# Patient Record
Sex: FEMALE | Race: BLACK OR AFRICAN AMERICAN | Employment: FULL TIME | URBAN - METROPOLITAN AREA
[De-identification: names, ages, dates, MRNs, and addresses within clinical notes are randomized per-mention and may not be internally consistent; named-entity substitution may affect disease eponyms.]

---

## 2018-01-22 ENCOUNTER — OFFICE VISIT (OUTPATIENT)
Dept: FAMILY MEDICINE CLINIC | Age: 48
End: 2018-01-22

## 2018-01-22 VITALS
HEART RATE: 79 BPM | TEMPERATURE: 98.8 F | RESPIRATION RATE: 18 BRPM | OXYGEN SATURATION: 98 % | SYSTOLIC BLOOD PRESSURE: 152 MMHG | BODY MASS INDEX: 32.96 KG/M2 | HEIGHT: 65 IN | WEIGHT: 197.8 LBS | DIASTOLIC BLOOD PRESSURE: 84 MMHG

## 2018-01-22 DIAGNOSIS — E55.9 VITAMIN D DEFICIENCY: ICD-10-CM

## 2018-01-22 DIAGNOSIS — R53.83 FATIGUE, UNSPECIFIED TYPE: ICD-10-CM

## 2018-01-22 DIAGNOSIS — F41.9 ANXIETY: ICD-10-CM

## 2018-01-22 DIAGNOSIS — R00.2 PALPITATIONS: ICD-10-CM

## 2018-01-22 DIAGNOSIS — I10 ESSENTIAL HYPERTENSION: Primary | ICD-10-CM

## 2018-01-22 DIAGNOSIS — E66.9 OBESITY (BMI 30.0-34.9): ICD-10-CM

## 2018-01-22 RX ORDER — LISINOPRIL AND HYDROCHLOROTHIAZIDE 12.5; 2 MG/1; MG/1
TABLET ORAL DAILY
COMMUNITY
End: 2018-01-22

## 2018-01-22 RX ORDER — HYDROCHLOROTHIAZIDE 25 MG/1
25 TABLET ORAL DAILY
Qty: 30 TAB | Refills: 1 | Status: SHIPPED | OUTPATIENT
Start: 2018-01-22 | End: 2018-03-05 | Stop reason: SDUPTHER

## 2018-01-22 RX ORDER — CHOLECALCIFEROL TAB 125 MCG (5000 UNIT) 125 MCG
30000 TAB ORAL
COMMUNITY
End: 2018-06-18

## 2018-01-22 RX ORDER — MELATONIN
DAILY
COMMUNITY
End: 2018-01-22

## 2018-01-22 NOTE — PROGRESS NOTES
Chief Complaint   Patient presents with    Establish Care    Hypertension    Constipation     1. Have you been to the ER, urgent care clinic since your last visit? Hospitalized since your last visit? No    2. Have you seen or consulted any other health care providers outside of the [address] [place] [address] since your last visit? Include any pap smears or colon screening.  Yes Where: Juan Alberto Nelson PCP and Dr.Micheal Arteaga [facility] and [facility]

## 2018-01-22 NOTE — PROGRESS NOTES
INTERNAL MEDICINE INITIAL PROVIDER VISIT    SUBJECTIVE:     Chief Complaint   Patient presents with    Establish Care    Hypertension    Constipation       HPI: 41 y.o. female with PMHx significant for obesity and HTN is here for the above chief complaint(s). Establish Care: Last PCP visit 3 years ago, last blood work 3 years ago, 1.5 years ago pt first on [email or web address]. Hypertension: Today BP is uncontrolled. Taking lisinopril-HCTZ 20-12.5 mg 1 tab daily, last dose 6 months ago. Tiredness and fatigue side effects from medications around middle of day, started taking at night. HCTZ without side effects by itself. Denies headache, lightheadedness, dizziness, vision changes, chest pain, rapid/irregular heart rate, shortness of breath, cough, abdominal pain, leg swelling, leg pain. Not working on low salt diet, rare activity/exercise. Weight watchers since October 2017, lost 7-8 lbs, stalled during the holidays. Does checks BP outside of office with readings averaging with headaches 160s/90s-100s. Constipation: 3 months ago, flat stool. Before incomplete bowel movements. 3rd week of 21 day fast, nuts and whole grains, oatmeal. Last BM yesterrday, on average BM every daily. In past 2x per week. Denies BRBPR, melena, no straing. Water intake per day 3-4 bottled lind. Coffee intake 1 cup per day. Palpitations: Occasional over past few weeks, off and on for past year. Feels during times of feeling overwhelmed. On average now 2x per week, lasting a few seconds. No chest pain or shortness of breath with. Increased anxiety and trouble controlling worry. Declines medications at this time. Muscle/Joint: Back, willing to talk about later    Low Energy: 1 year. Sleep no issue. Some snoring. No PND, orthopnea. Some AM headaches. No witnessed apneic episodes, 1 cup of coffee per day. Does TV/Phone/Reading in bed. Negative irregular bedtime. No stretchin gbefore bed.  STOP-BANG: + snoring, + day time fatigue, - observied apneic episodes, + HTN, - BMI >35, - AGE >50, - neck size >17 in male >15 in female, - male gender. Low- Intermediate risk      ROS: 12 point ROS completed, positive as per HPI      Current Outpatient Prescriptions   Medication Sig    OTHER Taking for cramps and lighter menses, 1x per month  Indications: [place] 1 tab monthly    levonorgestrel (MIRENA) 20 mcg/24 hr (5 years) IUD 1 Applicator. Starting 7/2015, 5 years    cholecalciferol, VITAMIN D3, (VITAMIN D3) 5,000 unit tab tablet Take 30,000 Units by mouth every seven (7) days.  hydroCHLOROthiazide (HYDRODIURIL) 25 mg tablet Take 1 Tab by mouth daily for 60 days. No current facility-administered medications for this visit. Health Maintenance   Topic Date Due    PAP AKA CERVICAL CYTOLOGY  06/28/1984    DTaP/Tdap/Td series (1 - Tdap) 06/28/1993    Influenza Age 5 to Adult  08/01/2017       Medications and Allergies: Reviewed and confirmed in the chart    Past Medical Hx: Reviewed and confirmed in the chart  Past Medical History:   Diagnosis Date    Hypertension 2009    Obesity (BMI 30.0-34.9) 2008    Vitamin D deficiency        Family Hx, Surgical Hx, Social Hx: Reviewed and updated in EMR    OBJECTIVE:  Vitals:    01/22/18 0937 01/22/18 1020   BP: 158/85 152/84   Pulse: 79    Resp: 18    Temp: 98.8 °F (37.1 °C)    TempSrc: Oral    SpO2: 98%    Weight: 197 lb 12.8 oz (89.7 kg)    Height: 5' 5\" (1.651 m)      General: Pleasant adult woman in no acute distress  HEENT: Head is atraumatic normo-cephalic. Pupils equally round and reactive to light, extraocular muscle intact, conjunctiva clear, non-icterus. Oral mucosa moist without erythema, ulceration. Fair Dentition. Neck: Supple, no LAD, no palpable thyromegaly. CVS:Heart regular, rate, and rhythm. Audible S1 and S2. No murmurs, rubs or gallops. PULM: Lungs clear to auscultation bilaterally. No wheezes, rales or rhonchi.    GI: Positive bowel sounds, soft, nondistended, non-tender. EXT: 2+ dorsalis pedis pulses bilaterally. No pedal edema bilaterally  Neuro: Alert and oriented x3. Gait wnl  MSE: Mood euthymic, affect congruent and reactive. No SI/HI. PHQ-9: 5  GREG-7: 6      Nursing Notes Reviewed    ASSESSMENT AND PLAN    ICD-10-CM ICD-9-CM    1. Essential hypertension I10 401.9 TSH 3RD GENERATION      METABOLIC PANEL, COMPREHENSIVE      hydroCHLOROthiazide (HYDRODIURIL) 25 mg tablet  BP log  DASH diet  RTC in 4 weeks   2. Vitamin D deficiency E55.9 268.9 VITAMIN D, 25 HYDROXY   3. Obesity (BMI 30.0-34. 9) E66.9 278.00 TSH 3RD GENERATION      LIPID PANEL  Diet and exercise discussed  SUNDAY signed for last PCP, blood work   4. Fatigue, unspecified type R53.83 780.79 TSH 3RD GENERATION      CBC WITH AUTOMATED DIFF   5. Anxiety F41.9 300.00 bhvr recommendations given  RTC in 4 weeks   6. Palpitations R00.2 785.1 Given urgent return precautions       Orders Placed This Encounter    TSH 3RD GENERATION    METABOLIC PANEL, COMPREHENSIVE    CBC WITH AUTOMATED DIFF    LIPID PANEL    VITAMIN D, 25 HYDROXY    DISCONTD: cholecalciferol (VITAMIN D3) 1,000 unit tablet    OTHER    DISCONTD: lisinopril-hydroCHLOROthiazide (PRINZIDE, ZESTORETIC) 20-12.5 mg per tablet    levonorgestrel (MIRENA) 20 mcg/24 hr (5 years) IUD    cholecalciferol, VITAMIN D3, (VITAMIN D3) 5,000 unit tab tablet    hydroCHLOROthiazide (HYDRODIURIL) 25 mg tablet       RTC IN 4 WEEKS f/u HTN, Anxiety    AVS printed and provided to patient    Assessment and plan above discussed with patient, patient voiced understanding and agreement with plan. More than 50% of this 60 min visit was spent face to face counseling the patient about diet, exercise, medications and other management for HTN, vitamin d deficiency, anxiety, obesity, fatigue      Manual Neri POLK   [address], [address] [place] [address] [phone]  [place] [phone]

## 2018-01-22 NOTE — MR AVS SNAPSHOT
[address] [place] [address] 
[phone] Patient: Kurtis Wiley MRN: [id number] :1970 Visit Information Date & Time Provider Department Dept. Phone Encounter #  
 2018  9:30 AM Marino He MD [address] [facility] [address] [phone] [id number] Follow-up Instructions Return in about 4 weeks (around 2018), or if symptoms worsen or fail to improve, for anxiety, HTN. Upcoming Health Maintenance Date Due  
 PAP AKA CERVICAL CYTOLOGY 1984 DTaP/Tdap/Td series (1 - Tdap) 1993 Influenza Age 5 to Adult 2017 Allergies as of 2018  Review Complete On: 2018 By: Radha Randolph LPN Severity Noted Reaction Type Reactions Codeine  2018    Nausea and Vomiting Current Immunizations  Never Reviewed No immunizations on file. Not reviewed this visit You Were Diagnosed With   
  
 Codes Comments Essential hypertension    -  Primary ICD-10-CM: I10 
ICD-9-CM: 401.9 Vitamin D deficiency     ICD-10-CM: E55.9 ICD-9-CM: 268.9 Obesity (BMI 30.0-34.9)     ICD-10-CM: N09.0 ICD-9-CM: 278.00 Fatigue, unspecified type     ICD-10-CM: R53.83 ICD-9-CM: 780.79 Anxiety     ICD-10-CM: F41.9 ICD-9-CM: 300.00 Palpitations     ICD-10-CM: R00.2 ICD-9-CM: 785.1 Vitals BP Pulse Temp Resp Height(growth percentile) Weight(growth percentile) 152/84 79 98.8 °F (37.1 °C) (Oral) 18 5' 5\" (1.651 m) 197 lb 12.8 oz (89.7 kg) LMP SpO2 BMI OB Status Smoking Status 2017 98% 32.92 kg/m2 Having regular periods Never Smoker Vitals History BMI and BSA Data Body Mass Index Body Surface Area  
 32.92 kg/m 2 2.03 m 2 Preferred Pharmacy Pharmacy Name Phone [id number], [phone]  [phone] Your Updated Medication List  
  
   
 This list is accurate as of: 1/22/18 10:34 AM.  Always use your most recent med list.  
  
  
  
  
 cholecalciferol (VITAMIN D3) 5,000 unit Tab tablet Commonly known as:  VITAMIN D3 Take 30,000 Units by mouth every seven (7) days. hydroCHLOROthiazide 25 mg tablet Commonly known as:  HYDRODIURIL Take 1 Tab by mouth daily for 60 days. MIRENA 20 mcg/24 hr (5 years) Iud  
Generic drug:  levonorgestrel 1 Applicator. Starting 7/2015, 5 years OTHER Taking for cramps and lighter menses, 1x per month  Indications: Rianna Ezio 1 tab monthly Prescriptions Sent to Pharmacy Refills  
 hydroCHLOROthiazide (HYDRODIURIL) 25 mg tablet 1 Sig: Take 1 Tab by mouth daily for 60 days. Class: Normal  
 Pharmacy: [id number]- [address] [phone]  #: [phone] Route: Oral  
  
Follow-up Instructions Return in about 4 weeks (around 2/19/2018), or if symptoms worsen or fail to improve, for anxiety, HTN. To-Do List   
 01/22/2018 Lab:  CBC WITH AUTOMATED DIFF   
  
 01/22/2018 Lab:  LIPID PANEL   
  
 01/22/2018 Lab:  METABOLIC PANEL, COMPREHENSIVE   
  
 01/22/2018 Lab:  TSH 3RD GENERATION   
  
 01/22/2018 Lab:  VITAMIN D, 25 HYDROXY Patient Instructions Return to in 1 week after starting HCTZ to give fasting blood after fasting 8 hours overnight. No food or coffee. Water and medicines are okay. Lab opens from 8:30AM to 4:30PM Monday through Friday. No need to schedule an appointment. If palpitations/rapid heart beats start lasting greater than a few minutes, associated with shortness of breath or chest pain report to local hospital or urgent care We will let you know the results of your blood and urine test when they come in. We will call if abnormal and send a letter if normal. 
 
HIGH BLOOD PRESSURE: 
Start taking hydrochlorothiazide 25 mg every morning with food Check blood pressure mid-day 1-2  x per week and write down If BP persistently >140/>90 call clinic to discuss with MD 
If BP <110/<70 call clinic to discuss Make sure you are seated for at least  5-10 minutes, legs uncrossed and back resting Bring log to clinic next visit Max salt per day 2 grams (2000 mg) Try to exercise 30 minutes every morning FOR ANXIETY: 
Work on meditation outlined below Try deep breathing, in through nose and out through mouth with increased anxiety/overwhelmed Try whole body tension relaxation as discussed in clinic Continue to eat three meals a day and and water at least 2 liters per day Work on stretching at night before bed HOW TO MEDITATE: SIMPLE MEDITATION FOR BEGINNERS 
  
This meditation exercise is an excellent introduction to meditation techniques. 
  
Sit comfortably. You may even want to invest in a meditation chair. Close your eyes. Make no effort to control the breath; simply breathe naturally. Focus your attention on the breath  (think \"im breathing in\" when breathing in, think \"im breathing out\" when breathing out) and on how the body moves with each inhalation and exhalation. Notice the movement of your body as you breathe. Observe your chest, shoulders, rib cage, and belly. Simply focus your attention on your breath without controlling its pace or intensity. If your mind wanders, return your focus back to your breath. Focus on breathing in as you take in a breath and focus on breathing out as you exhale Maintain this meditation practice for two to three minutes to start, and then try it for longer periods gradually extended to 5 to 10 minutes daily. Hydrochlorothiazide (By mouth) Hydrochlorothiazide (hakeem-droe-klor-hk-WUTN-p-zide) Treats high blood pressure and fluid retention (edema). This medicine is a diuretic (water pill). Brand Name(s): Microzide There may be other brand names for this medicine. When This Medicine Should Not Be Used: This medicine is not right for everyone. Do not use this medicine if you had an allergic reaction to hydrochlorothiazide or a sulfa drug. How to Use This Medicine:  
Capsule, Liquid, Tablet · Take your medicine as directed. Your dose may need to be changed several times to find what works best for you. · Measure the oral liquid medicine with a marked measuring spoon, oral syringe, or medicine cup. · Missed dose: Take a dose as soon as you remember. If it is almost time for your next dose, wait until then and take a regular dose. Do not take extra medicine to make up for a missed dose. · Store the medicine in a closed container at room temperature, away from heat, moisture, and direct light. Drugs and Foods to Avoid: Ask your doctor or pharmacist before using any other medicine, including over-the-counter medicines, vitamins, and herbal products. · Some medicines and foods can affect how hydrochlorothiazide works. Tell your doctor if you are also using any of the following: ¨ Cholestyramine, colestipol, digoxin, lithium, insulin or other diabetes medicine ¨ An NSAID pain or arthritis medicine (such as aspirin, diclofenac, ibuprofen, naproxen, celecoxib), or a steroid medicine (such as hydrocortisone, methylprednisolone, prednisone, prednisolone, dexamethasone) Warnings While Using This Medicine: · Tell your doctor if you are pregnant or breastfeeding, or if you have kidney disease, liver disease, heart disease or heart failure, high cholesterol, diabetes, gout, trouble urinating, or lupus. · This medicine may cause the following problems: ¨ Glaucoma and other vision problems ¨ Acute gout ¨ Damage to the parathyroid gland ¨ Low or high levels of minerals in your blood (including potassium and sodium) · This medicine may make you dizzy. Do not drive or do anything else that could be dangerous until you know how this medicine affects you. · This medicine could lower your blood pressure too much, especially when you first use it or if you are dehydrated. Stand or sit up slowly if you feel lightheaded or dizzy. Alcohol may make this problem worse. · Tell any doctor or dentist who treats you that you are using this medicine. · Your doctor will check your progress and the effects of this medicine at regular visits. Keep all appointments. · Keep all medicine out of the reach of children. Never share your medicine with anyone. Possible Side Effects While Using This Medicine:  
Call your doctor right away if you notice any of these side effects: · Allergic reaction: Itching or hives, swelling in your face or hands, swelling or tingling in your mouth or throat, chest tightness, trouble breathing · Blistering, peeling, or red skin rash · Confusion, weakness, and muscle twitching · Dry mouth, increased thirst, muscle cramps, nausea or vomiting, uneven heartbeat · Lightheadedness, dizziness, or fainting · Nausea, vomiting, unusual tiredness or weakness, muscle cramps, confusion · Trouble seeing, eye pain, blurred vision or other vision changes If you notice these less serious side effects, talk with your doctor:  
· Headache · Mild diarrhea, constipation, nausea If you notice other side effects that you think are caused by this medicine, tell your doctor. Call your doctor for medical advice about side effects. You may report side effects to FDA at 1-096-FDA-4082 © 2017 [address] Information is for End User's use only and may not be sold, redistributed or otherwise used for commercial purposes. The above information is an  only. It is not intended as medical advice for individual conditions or treatments. Talk to your doctor, nurse or pharmacist before following any medical regimen to see if it is safe and effective for you. DASH Diet: Care Instructions Your Care Instructions The DASH diet is an eating plan that can help lower your blood pressure. DASH stands for Dietary Approaches to Stop Hypertension. Hypertension is high blood pressure. The DASH diet focuses on eating foods that are high in calcium, potassium, and magnesium. These nutrients can lower blood pressure. The foods that are highest in these nutrients are fruits, vegetables, low-fat dairy products, nuts, seeds, and legumes. But taking calcium, potassium, and magnesium supplements instead of eating foods that are high in those nutrients does not have the same effect. The DASH diet also includes whole grains, fish, and poultry. The DASH diet is one of several lifestyle changes your doctor may recommend to lower your high blood pressure. Your doctor may also want you to decrease the amount of sodium in your diet. Lowering sodium while following the DASH diet can lower blood pressure even further than just the DASH diet alone. Follow-up care is a key part of your treatment and safety. Be sure to make and go to all appointments, and call your doctor if you are having problems. It's also a good idea to know your test results and keep a list of the medicines you take. How can you care for yourself at home? Following the DASH diet · Eat 4 to 5 servings of fruit each day. A serving is 1 medium-sized piece of fruit, ½ cup chopped or canned fruit, 1/4 cup dried fruit, or 4 ounces (½ cup) of fruit juice. Choose fruit more often than fruit juice. · Eat 4 to 5 servings of vegetables each day. A serving is 1 cup of lettuce or raw leafy vegetables, ½ cup of chopped or cooked vegetables, or 4 ounces (½ cup) of vegetable juice. Choose vegetables more often than vegetable juice. · Get 2 to 3 servings of low-fat and fat-free dairy each day. A serving is 8 ounces of milk, 1 cup of yogurt, or 1 ½ ounces of cheese. · Eat 6 to 8 servings of grains each day.  A serving is 1 slice of bread, 1 ounce of dry cereal, or ½ cup of cooked rice, pasta, or cooked cereal. Try to choose whole-grain products as much as possible. · Limit lean meat, poultry, and fish to 2 servings each day. A serving is 3 ounces, about the size of a deck of cards. · Eat 4 to 5 servings of nuts, seeds, and legumes (cooked dried beans, lentils, and split peas) each week. A serving is 1/3 cup of nuts, 2 tablespoons of seeds, or ½ cup of cooked beans or peas. · Limit fats and oils to 2 to 3 servings each day. A serving is 1 teaspoon of vegetable oil or 2 tablespoons of salad dressing. · Limit sweets and added sugars to 5 servings or less a week. A serving is 1 tablespoon jelly or jam, ½ cup sorbet, or 1 cup of lemonade. · Eat less than 2,300 milligrams (mg) of sodium a day. If you limit your sodium to 1,500 mg a day, you can lower your blood pressure even more. Tips for success · Start small. Do not try to make dramatic changes to your diet all at once. You might feel that you are missing out on your favorite foods and then be more likely to not follow the plan. Make small changes, and stick with them. Once those changes become habit, add a few more changes. · Try some of the following: ¨ Make it a goal to eat a fruit or vegetable at every meal and at snacks. This will make it easy to get the recommended amount of fruits and vegetables each day. ¨ Try yogurt topped with fruit and nuts for a snack or healthy dessert. ¨ Add lettuce, tomato, cucumber, and onion to sandwiches. ¨ Combine a ready-made pizza crust with low-fat mozzarella cheese and lots of vegetable toppings. Try using tomatoes, squash, spinach, broccoli, carrots, cauliflower, and onions. ¨ Have a variety of cut-up vegetables with a low-fat dip as an appetizer instead of chips and dip. ¨ Sprinkle sunflower seeds or chopped almonds over salads. Or try adding chopped walnuts or almonds to cooked vegetables. ¨ Try some vegetarian meals using beans and peas. Add garbanzo or kidney beans to salads. Make burritos and tacos with mashed aaron beans or black beans. Where can you learn more? Go to http://lily-osiel.info/. Enter D142 in the search box to learn more about \"DASH Diet: Care Instructions. \" Current as of: September 21, 2016 Content Version: 11.4 © 9181-2847 [employer]. Care instructions adapted under license by [employer] (which disclaims liability or warranty for this information). If you have questions about a medical condition or this instruction, always ask your healthcare professional. [place] [address] any warranty or liability for your use of this information. Introducing [facility]! Wes Mullen introduces [employer] patient portal. Now you can access parts of your medical record, email your doctor's office, and request medication refills online. 1. In your internet browser, go to [email or web address] 2. Click on the First Time User? Click Here link in the Sign In box. You will see the New Member Sign Up page. 3. Enter your [employer] Access Code exactly as it appears below. You will not need to use this code after youve completed the sign-up process. If you do not sign up before the expiration date, you must request a new code. · [employer] Access Code: [email or web address] Expires: 4/22/2018  9:34 AM 
 
4. Enter the last four digits of your Social Security Number (xxxx) and Date of Birth (mm/dd/yyyy) as indicated and click Submit. You will be taken to the next sign-up page. 5. Create a [employer] ID. This will be your [employer] login ID and cannot be changed, so think of one that is secure and easy to remember. 6. Create a [employer] password. You can change your password at any time. 7. Enter your Password Reset Question and Answer. This can be used at a later time if you forget your password. 8. Enter your e-mail address. You will receive e-mail notification when new information is available in [address] 9. Click Sign Up. You can now view and download portions of your medical record. 10. Click the Download Summary menu link to download a portable copy of your medical information. If you have questions, please visit the Frequently Asked Questions section of the [employer] website. Remember, [employer] is NOT to be used for urgent needs. For medical emergencies, dial 911. Now available from your iPhone and Android! Please provide this summary of care documentation to your next provider. Your primary care clinician is listed as Sunday Gunn. If you have any questions after today's visit, please call [phone].

## 2018-01-22 NOTE — PATIENT INSTRUCTIONS
Return to in 1 week after starting HCTZ to give fasting blood after fasting 8 hours overnight. No food or coffee. Water and medicines are okay. Lab opens from 8:30AM to 4:30PM Monday through Friday. No need to schedule an appointment. If palpitations/rapid heart beats start lasting greater than a few minutes, associated with shortness of breath or chest pain report to local hospital or urgent care    We will let you know the results of your blood and urine test when they come in. We will call if abnormal and send a letter if normal.    HIGH BLOOD PRESSURE:  Start taking hydrochlorothiazide 25 mg every morning with food  Check blood pressure mid-day 1-2  x per week and write down  If BP persistently >140/>90 call clinic to discuss with MD  If BP <110/<70 call clinic to discuss  Make sure you are seated for at least  5-10 minutes, legs uncrossed and back resting  Bring log to clinic next visit  Max salt per day 2 grams (2000 mg)  Try to exercise 30 minutes every morning    FOR ANXIETY:  Work on meditation outlined below  Try deep breathing, in through nose and out through mouth with increased anxiety/overwhelmed  Try whole body tension relaxation as discussed in clinic  Continue to eat three meals a day and and water at least 2 liters per day  Work on stretching at night before bed    HOW TO MEDITATE: SIMPLE MEDITATION FOR BEGINNERS     This meditation exercise is an excellent introduction to meditation techniques.     Sit comfortably. You may even want to invest in a meditation chair. Close your eyes. Make no effort to control the breath; simply breathe naturally. Focus your attention on the breath  (think \"im breathing in\" when breathing in, think \"im breathing out\" when breathing out) and on how the body moves with each inhalation and exhalation. Notice the movement of your body as you breathe. Observe your chest, shoulders, rib cage, and belly.  Simply focus your attention on your breath without controlling its pace or intensity. If your mind wanders, return your focus back to your breath. Focus on breathing in as you take in a breath and focus on breathing out as you exhale  Maintain this meditation practice for two to three minutes to start, and then try it for longer periods gradually extended to 5 to 10 minutes daily. Hydrochlorothiazide (By mouth)   Hydrochlorothiazide (hakeem-droe-klor-ge-CDHR-u-zide)  Treats high blood pressure and fluid retention (edema). This medicine is a diuretic (water pill). Brand Name(s): Microzide   There may be other brand names for this medicine. When This Medicine Should Not Be Used: This medicine is not right for everyone. Do not use this medicine if you had an allergic reaction to hydrochlorothiazide or a sulfa drug. How to Use This Medicine:   Capsule, Liquid, Tablet  · Take your medicine as directed. Your dose may need to be changed several times to find what works best for you. · Measure the oral liquid medicine with a marked measuring spoon, oral syringe, or medicine cup. · Missed dose: Take a dose as soon as you remember. If it is almost time for your next dose, wait until then and take a regular dose. Do not take extra medicine to make up for a missed dose. · Store the medicine in a closed container at room temperature, away from heat, moisture, and direct light. Drugs and Foods to Avoid:   Ask your doctor or pharmacist before using any other medicine, including over-the-counter medicines, vitamins, and herbal products. · Some medicines and foods can affect how hydrochlorothiazide works.  Tell your doctor if you are also using any of the following:   ¨ Cholestyramine, colestipol, digoxin, lithium, insulin or other diabetes medicine  ¨ An NSAID pain or arthritis medicine (such as aspirin, diclofenac, ibuprofen, naproxen, celecoxib), or a steroid medicine (such as hydrocortisone, methylprednisolone, prednisone, prednisolone, dexamethasone)  Warnings While Using This Medicine:   · Tell your doctor if you are pregnant or breastfeeding, or if you have kidney disease, liver disease, heart disease or heart failure, high cholesterol, diabetes, gout, trouble urinating, or lupus. · This medicine may cause the following problems:  ¨ Glaucoma and other vision problems  ¨ Acute gout  ¨ Damage to the parathyroid gland  ¨ Low or high levels of minerals in your blood (including potassium and sodium)  · This medicine may make you dizzy. Do not drive or do anything else that could be dangerous until you know how this medicine affects you. · This medicine could lower your blood pressure too much, especially when you first use it or if you are dehydrated. Stand or sit up slowly if you feel lightheaded or dizzy. Alcohol may make this problem worse. · Tell any doctor or dentist who treats you that you are using this medicine. · Your doctor will check your progress and the effects of this medicine at regular visits. Keep all appointments. · Keep all medicine out of the reach of children. Never share your medicine with anyone. Possible Side Effects While Using This Medicine:   Call your doctor right away if you notice any of these side effects:  · Allergic reaction: Itching or hives, swelling in your face or hands, swelling or tingling in your mouth or throat, chest tightness, trouble breathing  · Blistering, peeling, or red skin rash  · Confusion, weakness, and muscle twitching  · Dry mouth, increased thirst, muscle cramps, nausea or vomiting, uneven heartbeat  · Lightheadedness, dizziness, or fainting  · Nausea, vomiting, unusual tiredness or weakness, muscle cramps, confusion  · Trouble seeing, eye pain, blurred vision or other vision changes  If you notice these less serious side effects, talk with your doctor:   · Headache  · Mild diarrhea, constipation, nausea  If you notice other side effects that you think are caused by this medicine, tell your doctor.    Call your doctor for medical advice about side effects. You may report side effects to FDA at 8-655-FDA-1800  © 2017 [address] [employer] Information is for End User's use only and may not be sold, redistributed or otherwise used for commercial purposes. The above information is an  only. It is not intended as medical advice for individual conditions or treatments. Talk to your doctor, nurse or pharmacist before following any medical regimen to see if it is safe and effective for you. DASH Diet: Care Instructions  Your Care Instructions    The DASH diet is an eating plan that can help lower your blood pressure. DASH stands for Dietary Approaches to Stop Hypertension. Hypertension is high blood pressure. The DASH diet focuses on eating foods that are high in calcium, potassium, and magnesium. These nutrients can lower blood pressure. The foods that are highest in these nutrients are fruits, vegetables, low-fat dairy products, nuts, seeds, and legumes. But taking calcium, potassium, and magnesium supplements instead of eating foods that are high in those nutrients does not have the same effect. The DASH diet also includes whole grains, fish, and poultry. The DASH diet is one of several lifestyle changes your doctor may recommend to lower your high blood pressure. Your doctor may also want you to decrease the amount of sodium in your diet. Lowering sodium while following the DASH diet can lower blood pressure even further than just the DASH diet alone. Follow-up care is a key part of your treatment and safety. Be sure to make and go to all appointments, and call your doctor if you are having problems. It's also a good idea to know your test results and keep a list of the medicines you take. How can you care for yourself at home? Following the DASH diet  · Eat 4 to 5 servings of fruit each day.  A serving is 1 medium-sized piece of fruit, ½ cup chopped or canned fruit, 1/4 cup dried fruit, or 4 ounces (½ cup) of fruit juice. Choose fruit more often than fruit juice. · Eat 4 to 5 servings of vegetables each day. A serving is 1 cup of lettuce or raw leafy vegetables, ½ cup of chopped or cooked vegetables, or 4 ounces (½ cup) of vegetable juice. Choose vegetables more often than vegetable juice. · Get 2 to 3 servings of low-fat and fat-free dairy each day. A serving is 8 ounces of milk, 1 cup of yogurt, or 1 ½ ounces of cheese. · Eat 6 to 8 servings of grains each day. A serving is 1 slice of bread, 1 ounce of dry cereal, or ½ cup of cooked rice, pasta, or cooked cereal. Try to choose whole-grain products as much as possible. · Limit lean meat, poultry, and fish to 2 servings each day. A serving is 3 ounces, about the size of a deck of cards. · Eat 4 to 5 servings of nuts, seeds, and legumes (cooked dried beans, lentils, and split peas) each week. A serving is 1/3 cup of nuts, 2 tablespoons of seeds, or ½ cup of cooked beans or peas. · Limit fats and oils to 2 to 3 servings each day. A serving is 1 teaspoon of vegetable oil or 2 tablespoons of salad dressing. · Limit sweets and added sugars to 5 servings or less a week. A serving is 1 tablespoon jelly or jam, ½ cup sorbet, or 1 cup of lemonade. · Eat less than 2,300 milligrams (mg) of sodium a day. If you limit your sodium to 1,500 mg a day, you can lower your blood pressure even more. Tips for success  · Start small. Do not try to make dramatic changes to your diet all at once. You might feel that you are missing out on your favorite foods and then be more likely to not follow the plan. Make small changes, and stick with them. Once those changes become habit, add a few more changes. · Try some of the following:  ¨ Make it a goal to eat a fruit or vegetable at every meal and at snacks. This will make it easy to get the recommended amount of fruits and vegetables each day. ¨ Try yogurt topped with fruit and nuts for a snack or healthy dessert.   ¨ Add lettuce, tomato, cucumber, and onion to sandwiches. ¨ Combine a ready-made pizza crust with low-fat mozzarella cheese and lots of vegetable toppings. Try using tomatoes, squash, spinach, broccoli, carrots, cauliflower, and onions. ¨ Have a variety of cut-up vegetables with a low-fat dip as an appetizer instead of chips and dip. ¨ Sprinkle sunflower seeds or chopped almonds over salads. Or try adding chopped walnuts or almonds to cooked vegetables. ¨ Try some vegetarian meals using beans and peas. Add garbanzo or kidney beans to salads. Make burritos and tacos with mashed aaron beans or black beans. Where can you learn more? Go to http://lily-osiel.info/. Enter S878 in the search box to learn more about \"DASH Diet: Care Instructions. \"  Current as of: September 21, 2016  Content Version: 11.4  © 0188-7704 [employer]. Care instructions adapted under license by [employer] (which disclaims liability or warranty for this information). If you have questions about a medical condition or this instruction, always ask your healthcare professional. [place] [address] any warranty or liability for your use of this information.

## 2018-02-09 ENCOUNTER — HOSPITAL ENCOUNTER (OUTPATIENT)
Dept: LAB | Age: 48
Discharge: HOME | End: 2018-02-09
Payer: COMMERCIAL

## 2018-02-09 DIAGNOSIS — E55.9 VITAMIN D DEFICIENCY: ICD-10-CM

## 2018-02-09 DIAGNOSIS — E66.9 OBESITY (BMI 30.0-34.9): ICD-10-CM

## 2018-02-09 DIAGNOSIS — I10 ESSENTIAL HYPERTENSION: ICD-10-CM

## 2018-02-09 DIAGNOSIS — R53.83 FATIGUE, UNSPECIFIED TYPE: ICD-10-CM

## 2018-02-09 LAB
ALBUMIN SERPL-MCNC: 3.6 G/DL (ref 3.4–5)
ALBUMIN/GLOB SERPL: 1.1 {RATIO} (ref 0.8–1.7)
ALP SERPL-CCNC: 84 U/L (ref 45–117)
ALT SERPL-CCNC: 18 U/L (ref 13–56)
ANION GAP SERPL CALC-SCNC: 9 MMOL/L (ref 3–18)
AST SERPL-CCNC: 17 U/L (ref 15–37)
BASOPHILS # BLD: 0 K/UL (ref 0–0.06)
BASOPHILS NFR BLD: 1 % (ref 0–2)
BILIRUB SERPL-MCNC: 0.4 MG/DL (ref 0.2–1)
BUN SERPL-MCNC: 9 MG/DL (ref 7–18)
BUN/CREAT SERPL: 10 (ref 12–20)
CALCIUM SERPL-MCNC: 9.1 MG/DL (ref 8.5–10.1)
CHLORIDE SERPL-SCNC: 100 MMOL/L (ref 100–108)
CHOLEST SERPL-MCNC: 165 MG/DL
CO2 SERPL-SCNC: 28 MMOL/L (ref 21–32)
CREAT SERPL-MCNC: 0.88 MG/DL (ref 0.6–1.3)
DIFFERENTIAL METHOD BLD: NORMAL
EOSINOPHIL # BLD: 0.1 K/UL (ref 0–0.4)
EOSINOPHIL NFR BLD: 2 % (ref 0–5)
ERYTHROCYTE [DISTWIDTH] IN BLOOD BY AUTOMATED COUNT: 14 % (ref 11.6–14.5)
GLOBULIN SER CALC-MCNC: 3.2 G/DL (ref 2–4)
GLUCOSE SERPL-MCNC: 93 MG/DL (ref 74–99)
HCT VFR BLD AUTO: 40.2 % (ref 35–45)
HDLC SERPL-MCNC: 52 MG/DL (ref 40–60)
HDLC SERPL: 3.2 {RATIO} (ref 0–5)
HGB BLD-MCNC: 12.9 G/DL (ref 12–16)
LDLC SERPL CALC-MCNC: 91.6 MG/DL (ref 0–100)
LIPID PROFILE,FLP: NORMAL
LYMPHOCYTES # BLD: 1.9 K/UL (ref 0.9–3.6)
LYMPHOCYTES NFR BLD: 30 % (ref 21–52)
MCH RBC QN AUTO: 26.7 PG (ref 24–34)
MCHC RBC AUTO-ENTMCNC: 32.1 G/DL (ref 31–37)
MCV RBC AUTO: 83.1 FL (ref 74–97)
MONOCYTES # BLD: 0.5 K/UL (ref 0.05–1.2)
MONOCYTES NFR BLD: 9 % (ref 3–10)
NEUTS SEG # BLD: 3.7 K/UL (ref 1.8–8)
NEUTS SEG NFR BLD: 58 % (ref 40–73)
PLATELET # BLD AUTO: 268 K/UL (ref 135–420)
PMV BLD AUTO: 10 FL (ref 9.2–11.8)
POTASSIUM SERPL-SCNC: 3.5 MMOL/L (ref 3.5–5.5)
PROT SERPL-MCNC: 6.8 G/DL (ref 6.4–8.2)
RBC # BLD AUTO: 4.84 M/UL (ref 4.2–5.3)
SODIUM SERPL-SCNC: 137 MMOL/L (ref 136–145)
TRIGL SERPL-MCNC: 107 MG/DL (ref ?–150)
TSH SERPL DL<=0.05 MIU/L-ACNC: 0.74 UIU/ML (ref 0.36–3.74)
VLDLC SERPL CALC-MCNC: 21.4 MG/DL
WBC # BLD AUTO: 6.3 K/UL (ref 4.6–13.2)

## 2018-02-09 PROCEDURE — 85025 COMPLETE CBC W/AUTO DIFF WBC: CPT | Performed by: INTERNAL MEDICINE

## 2018-02-09 PROCEDURE — 84443 ASSAY THYROID STIM HORMONE: CPT | Performed by: INTERNAL MEDICINE

## 2018-02-09 PROCEDURE — 36415 COLL VENOUS BLD VENIPUNCTURE: CPT | Performed by: INTERNAL MEDICINE

## 2018-02-09 PROCEDURE — 82306 VITAMIN D 25 HYDROXY: CPT | Performed by: INTERNAL MEDICINE

## 2018-02-09 PROCEDURE — 80053 COMPREHEN METABOLIC PANEL: CPT | Performed by: INTERNAL MEDICINE

## 2018-02-09 PROCEDURE — 80061 LIPID PANEL: CPT | Performed by: INTERNAL MEDICINE

## 2018-02-10 LAB — 25(OH)D3 SERPL-MCNC: 36.7 NG/ML (ref 30–100)

## 2018-02-27 ENCOUNTER — TELEPHONE (OUTPATIENT)
Dept: FAMILY MEDICINE CLINIC | Age: 48
End: 2018-02-27

## 2018-02-28 ENCOUNTER — TELEPHONE (OUTPATIENT)
Dept: FAMILY MEDICINE CLINIC | Age: 48
End: 2018-02-28

## 2018-02-28 NOTE — TELEPHONE ENCOUNTER
Pt's  Dariana Alarcon called back and stated forms will faxed within a few minutes. States please complete form and fax back by today if possible.

## 2018-02-28 NOTE — TELEPHONE ENCOUNTER
Health care biometric form completed and placed in outgoing fax box. Yamileth Barragan M.D.   [address], [address] [place] [address], [address] [place] [address] -   [place] - [phone]

## 2018-02-28 NOTE — TELEPHONE ENCOUNTER
LPN please call family back to inform no paperwork was received for patient from any companies. Please have insurance company fax information to [phone] for completion. If appointment needed will call to inform. Edmond Su M.D.   [address] [phone]  N - [phone]

## 2018-02-28 NOTE — TELEPHONE ENCOUNTER
Pt's  Lana Mace called to follow upon wellness form for pt. Dodie Hoff of [employer] message have not received any paper work. Lana Mace stated ok will call insurance company have them refax it.

## 2018-02-28 NOTE — TELEPHONE ENCOUNTER
Contacted pt's  Heather Cooper to notify form was completed and faxed. Confirmation received. Stated ok thank you.

## 2018-03-05 ENCOUNTER — OFFICE VISIT (OUTPATIENT)
Dept: FAMILY MEDICINE CLINIC | Age: 48
End: 2018-03-05

## 2018-03-05 VITALS
OXYGEN SATURATION: 100 % | BODY MASS INDEX: 33.66 KG/M2 | WEIGHT: 202 LBS | HEIGHT: 65 IN | HEART RATE: 88 BPM | DIASTOLIC BLOOD PRESSURE: 79 MMHG | TEMPERATURE: 97.8 F | RESPIRATION RATE: 18 BRPM | SYSTOLIC BLOOD PRESSURE: 150 MMHG

## 2018-03-05 DIAGNOSIS — E66.9 OBESITY (BMI 30.0-34.9): ICD-10-CM

## 2018-03-05 DIAGNOSIS — I10 ESSENTIAL HYPERTENSION: Primary | ICD-10-CM

## 2018-03-05 DIAGNOSIS — F41.9 ANXIETY: ICD-10-CM

## 2018-03-05 RX ORDER — HYDROCHLOROTHIAZIDE 25 MG/1
25 TABLET ORAL DAILY
Qty: 30 TAB | Refills: 5 | Status: SHIPPED | OUTPATIENT
Start: 2018-03-05 | End: 2018-03-06 | Stop reason: SDUPTHER

## 2018-03-05 NOTE — MR AVS SNAPSHOT
[address] [place] [address] 
[phone] Patient: Florence Cortez MRN: [id number] :1970 Visit Information Date & Time Provider Department Dept. Phone Encounter #  
 3/5/2018  9:30 AM Raghu Davalos MD [address] [phone] [id number] Follow-up Instructions Return in about 6 weeks (around 2018), or if symptoms worsen or fail to improve, for HTN, anxiety. Upcoming Health Maintenance Date Due  
 PAP AKA CERVICAL CYTOLOGY 2018 DTaP/Tdap/Td series (2 - Td) 3/5/2028 Allergies as of 3/5/2018  Review Complete On: 3/5/2018 By: Raghu Davalos MD  
  
 Severity Noted Reaction Type Reactions Codeine  2018    Nausea and Vomiting Current Immunizations  Never Reviewed No immunizations on file. Not reviewed this visit You Were Diagnosed With   
  
 Codes Comments Essential hypertension    -  Primary ICD-10-CM: I10 
ICD-9-CM: 401.9 Anxiety     ICD-10-CM: F41.9 ICD-9-CM: 300.00 Obesity (BMI 30.0-34.9)     ICD-10-CM: G83.8 ICD-9-CM: 278.00 Vitals BP Pulse Temp Resp Height(growth percentile) Weight(growth percentile) 150/79 88 97.8 °F (36.6 °C) (Oral) 18 5' 5\" (1.651 m) 202 lb (91.6 kg) LMP SpO2 BMI OB Status Smoking Status 2018 100% 33.61 kg/m2 Having regular periods Never Smoker Vitals History BMI and BSA Data Body Mass Index Body Surface Area  
 33.61 kg/m 2 2.05 m 2 Preferred Pharmacy Pharmacy Name Phone [id number] [phone] [facility] [phone] Your Updated Medication List  
  
   
This list is accurate as of 3/5/18 10:07 AM.  Always use your most recent med list.  
  
  
  
  
 cholecalciferol (VITAMIN D3) 5,000 unit Tab tablet Commonly known as:  VITAMIN D3 Take 30,000 Units by mouth every seven (7) days. hydroCHLOROthiazide 25 mg tablet Commonly known as:  HYDRODIURIL Take 1 Tab by mouth daily for 180 days. MIRENA 20 mcg/24 hr (5 years) Iud  
Generic drug:  levonorgestrel 1 Applicator. Starting 7/2015, 5 years OTHER Taking for cramps and lighter menses, 1x per month  Indications: Leesa Rasp 1 tab monthly Prescriptions Sent to Pharmacy Refills  
 hydroCHLOROthiazide (HYDRODIURIL) 25 mg tablet 5 Sig: Take 1 Tab by mouth daily for 180 days. Class: Normal  
 Pharmacy: [facility] #[id number]- [phone] Gillian Shaver  #: [phone] Route: Oral  
  
Follow-up Instructions Return in about 6 weeks (around 4/16/2018), or if symptoms worsen or fail to improve, for HTN, anxiety. Patient Instructions FOR HIGH BLOOD PRESSURE: 
Try to exercise 30 minutes a day in morning Watch salt in diet 2 grams per day max Work on 2 liters of water per day at least 
Continue HCTZ 25 mg every morning FOR ANXIETY: 
Work on meditation outlined below Try deep breathing, in through nose and out through mouth with increased anxiety/overwhelmed Try whole body tension relaxation as discussed in clinic Continue to eat three meals a day and and water at least 2 liters per day Work on stretching at night before bed 
  
HOW TO MEDITATE: SIMPLE MEDITATION FOR BEGINNERS This meditation exercise is an excellent introduction to meditation techniques. Sit comfortably. You may even want to invest in a meditation chair. Close your eyes. Make no effort to control the breath; simply breathe naturally. Focus your attention on the breath  (think \"im breathing in\" when breathing in, think \"im breathing out\" when breathing out) and on how the body moves with each inhalation and exhalation. Notice the movement of your body as you breathe. Observe your chest, shoulders, rib cage, and belly.  Simply focus your attention on your breath without controlling its pace or intensity. If your mind wanders, return your focus back to your breath. Focus on breathing in as you take in a breath and focus on breathing out as you exhale Maintain this meditation practice for two to three minutes to start, and then try it for longer periods gradually extended to 5 to 10 minutes daily. Learning About Low-Carbohydrate Diets for Weight Loss What is a low-carbohydrate diet? Low-carb diets avoid foods that are high in carbohydrate. These high-carb foods include pasta, bread, rice, cereal, fruits, and starchy vegetables. Instead, these diets usually have you eat foods that are high in fat and protein. Many people lose weight quickly on a low-carb diet. But the early weight loss is water. People on this diet often gain the weight back after they start eating carbs again. Not all diet plans are safe or work well. A lot of the evidence shows that low-carb diets aren't healthy. That's because these diets often don't include healthy foods like fruits and vegetables. Losing weight safely means balancing protein, fat, and carbs with every meal and snack. And low-carb diets don't always provide the vitamins, minerals, and fiber you need. If you have a serious medical condition, talk to your doctor before you try any diet. These conditions include kidney disease, heart disease, type 2 diabetes, high cholesterol, and high blood pressure. If you are pregnant, it may not be safe for your baby if you are on a low-carb diet. How can you lose weight safely? You might have heard that a diet plan helped another person lose weight. But that doesn't mean that it will work for you. It is very hard to stay on a diet that includes lots of big changes in your eating habits. If you want to get to a healthy weight and stay there, making healthy lifestyle changes will often work better than dieting. These steps can help. · Make a plan for change.  Work with your doctor to create a plan that is right for you. · See a dietitian. He or she can show you how to make healthy changes in your eating habits. · Manage stress. If you have a lot of stress in your life, it can be hard to focus on making healthy changes to your daily habits. · Track your food and activity. You are likely to do better at losing weight if you keep track of what you eat and what you do. Follow-up care is a key part of your treatment and safety. Be sure to make and go to all appointments, and call your doctor if you are having problems. It's also a good idea to know your test results and keep a list of the medicines you take. Where can you learn more? Go to http://lily-osiel.info/. Enter A121 in the search box to learn more about \"Learning About Low-Carbohydrate Diets for Weight Loss. \" Current as of: December 8, 2016 Content Version: 11.3 © 0670-3480 [employer]. Care instructions adapted under license by [facility] (which disclaims liability or warranty for this information). If you have questions about a medical condition or this instruction, always ask your healthcare professional. [place] [address] any warranty or liability for your use of this information. 
 
 
  
  
Body Mass Index: Care Instructions Your Care Instructions Body mass index (BMI) can help you see if your weight is raising your risk for health problems. It uses a formula to compare how much you weigh with how tall you are. A BMI lower than 18.5 is considered underweight. A BMI between 18.5 and 24.9 is considered healthy. A BMI between 25 and 29.9 is considered overweight. A BMI of 30 or higher is considered obese. If your BMI is in the normal range, it means that you have a lower risk for weight-related health problems.  If your BMI is in the overweight or obese range, you may be at increased risk for weight-related health problems, such as high blood pressure, heart disease, stroke, arthritis or joint pain, and diabetes. If your BMI is in the underweight range, you may be at increased risk for health problems such as fatigue, lower protection (immunity) against illness, muscle loss, bone loss, hair loss, and hormone problems. BMI is just one measure of your risk for weight-related health problems. You may be at higher risk for health problems if you are not active, you eat an unhealthy diet, or you drink too much alcohol or use tobacco products. Follow-up care is a key part of your treatment and safety. Be sure to make and go to all appointments, and call your doctor if you are having problems. It's also a good idea to know your test results and keep a list of the medicines you take. How can you care for yourself at home? Practice healthy eating habits. This includes eating plenty of fruits, vegetables, whole grains, lean protein, and low-fat dairy. If your doctor recommends it, get more exercise. Walking is a good choice. Bit by bit, increase the amount you walk every day. Try for at least 30 minutes on most days of the week. Do not smoke. Smoking can increase your risk for health problems. If you need help quitting, talk to your doctor about stop-smoking programs and medicines. These can increase your chances of quitting for good. Limit alcohol to 2 drinks a day for men and 1 drink a day for women. Too much alcohol can cause health problems. If you have a BMI higher than 25 Your doctor may do other tests to check your risk for weight-related health problems. This may include measuring the distance around your waist. A waist measurement of more than 40 inches in men or 35 inches in women can increase the risk of weight-related health problems. Talk with your doctor about steps you can take to stay healthy or improve your health.  You may need to make lifestyle changes to lose weight and stay healthy, such as changing your diet and getting regular exercise. If you have a BMI lower than 18.5 Your doctor may do other tests to check your risk for health problems. Talk with your doctor about steps you can take to stay healthy or improve your health. You may need to make lifestyle changes to gain or maintain weight and stay healthy, such as getting more healthy foods in your diet and doing exercises to build muscle. Where can you learn more? Go to http://lily-osiel.info/. Enter S176 in the search box to learn more about \"Body Mass Index: Care Instructions. \" Current as of: October 13, 2016 Content Version: 11.4 © 3029-6983 [employer]. Care instructions adapted under license by [employer] (which disclaims liability or warranty for this information). If you have questions about a medical condition or this instruction, always ask your healthcare professional. [place] [address] any warranty or liability for your use of this information. Introducing hospitals & HEALTH SERVICES! [employer] introduces The [employer] patient portal. Now you can access parts of your medical record, email your doctor's office, and request medication refills online. 1. In your internet browser, go to [email or web address] 2. Click on the First Time User? Click Here link in the Sign In box. You will see the New Member Sign Up page. 3. Enter your [employer] Access Code exactly as it appears below. You will not need to use this code after youve completed the sign-up process. If you do not sign up before the expiration date, you must request a new code. · [employer] Access Code: [email or web address] Expires: 4/22/2018  9:34 AM 
 
4. Enter the last four digits of your Social Security Number (xxxx) and Date of Birth (mm/dd/yyyy) as indicated and click Submit. You will be taken to the next sign-up page. 5. Create a Deep Glint ID. This will be your Deep Glint login ID and cannot be changed, so think of one that is secure and easy to remember. 6. Create a Deep Glint password. You can change your password at any time. 7. Enter your Password Reset Question and Answer. This can be used at a later time if you forget your password. 8. Enter your e-mail address. You will receive e-mail notification when new information is available in [address] 9. Click Sign Up. You can now view and download portions of your medical record. 10. Click the Download Summary menu link to download a portable copy of your medical information. If you have questions, please visit the Frequently Asked Questions section of the Deep Glint website. Remember, [employer]int is NOT to be used for urgent needs. For medical emergencies, dial 911. Now available from your iPhone and Android! Please provide this summary of care documentation to your next provider. Your primary care clinician is listed as Yary White. If you have any questions after today's visit, please call [phone].

## 2018-03-05 NOTE — PATIENT INSTRUCTIONS
FOR HIGH BLOOD PRESSURE:  Try to exercise 30 minutes a day in morning  Watch salt in diet 2 grams per day max  Work on 2 liters of water per day at least  Continue HCTZ 25 mg every morning    FOR ANXIETY:  Work on meditation outlined below  Try deep breathing, in through nose and out through mouth with increased anxiety/overwhelmed  Try whole body tension relaxation as discussed in clinic  Continue to eat three meals a day and and water at least 2 liters per day  Work on stretching at night before bed     HOW TO MEDITATE: SIMPLE MEDITATION FOR BEGINNERS  This meditation exercise is an excellent introduction to meditation techniques. Sit comfortably. You may even want to invest in a meditation chair. Close your eyes. Make no effort to control the breath; simply breathe naturally. Focus your attention on the breath  (think \"im breathing in\" when breathing in, think \"im breathing out\" when breathing out) and on how the body moves with each inhalation and exhalation. Notice the movement of your body as you breathe. Observe your chest, shoulders, rib cage, and belly. Simply focus your attention on your breath without controlling its pace or intensity. If your mind wanders, return your focus back to your breath. Focus on breathing in as you take in a breath and focus on breathing out as you exhale  Maintain this meditation practice for two to three minutes to start, and then try it for longer periods gradually extended to 5 to 10 minutes daily. Learning About Low-Carbohydrate Diets for Weight Loss  What is a low-carbohydrate diet? Low-carb diets avoid foods that are high in carbohydrate. These high-carb foods include pasta, bread, rice, cereal, fruits, and starchy vegetables. Instead, these diets usually have you eat foods that are high in fat and protein. Many people lose weight quickly on a low-carb diet. But the early weight loss is water.  People on this diet often gain the weight back after they start eating carbs again. Not all diet plans are safe or work well. A lot of the evidence shows that low-carb diets aren't healthy. That's because these diets often don't include healthy foods like fruits and vegetables. Losing weight safely means balancing protein, fat, and carbs with every meal and snack. And low-carb diets don't always provide the vitamins, minerals, and fiber you need. If you have a serious medical condition, talk to your doctor before you try any diet. These conditions include kidney disease, heart disease, type 2 diabetes, high cholesterol, and high blood pressure. If you are pregnant, it may not be safe for your baby if you are on a low-carb diet. How can you lose weight safely? You might have heard that a diet plan helped another person lose weight. But that doesn't mean that it will work for you. It is very hard to stay on a diet that includes lots of big changes in your eating habits. If you want to get to a healthy weight and stay there, making healthy lifestyle changes will often work better than dieting. These steps can help. · Make a plan for change. Work with your doctor to create a plan that is right for you. · See a dietitian. He or she can show you how to make healthy changes in your eating habits. · Manage stress. If you have a lot of stress in your life, it can be hard to focus on making healthy changes to your daily habits. · Track your food and activity. You are likely to do better at losing weight if you keep track of what you eat and what you do. Follow-up care is a key part of your treatment and safety. Be sure to make and go to all appointments, and call your doctor if you are having problems. It's also a good idea to know your test results and keep a list of the medicines you take. Where can you learn more? Go to http://lily-osiel.info/.   Enter [phone] [id number] in the search box to learn more about \"Learning About Low-Carbohydrate Diets for Weight Loss.\"  Current as of: December 8, 2016  Content Version: 11.3  © 4076-0675 [employer] Care instructions adapted under license by [employer] (which disclaims liability or warranty for this information). If you have questions about a medical condition or this instruction, always ask your healthcare professional. [address] any warranty or liability for your use of this information.            Body Mass Index: Care Instructions  Your Care Instructions    Body mass index (BMI) can help you see if your weight is raising your risk for health problems. It uses a formula to compare how much you weigh with how tall you are. A BMI lower than 18.5 is considered underweight. A BMI between 18.5 and 24.9 is considered healthy. A BMI between 25 and 29.9 is considered overweight. A BMI of 30 or higher is considered obese. If your BMI is in the normal range, it means that you have a lower risk for weight-related health problems. If your BMI is in the overweight or obese range, you may be at increased risk for weight-related health problems, such as high blood pressure, heart disease, stroke, arthritis or joint pain, and diabetes. If your BMI is in the underweight range, you may be at increased risk for health problems such as fatigue, lower protection (immunity) against illness, muscle loss, bone loss, hair loss, and hormone problems. BMI is just one measure of your risk for weight-related health problems. You may be at higher risk for health problems if you are not active, you eat an unhealthy diet, or you drink too much alcohol or use tobacco products. Follow-up care is a key part of your treatment and safety. Be sure to make and go to all appointments, and call your doctor if you are having problems. It's also a good idea to know your test results and keep a list of the medicines you take. How can you care for yourself at home? Practice healthy eating habits.  This includes eating plenty of fruits, vegetables, whole grains, lean protein, and low-fat dairy. If your doctor recommends it, get more exercise. Walking is a good choice. Bit by bit, increase the amount you walk every day. Try for at least 30 minutes on most days of the week. Do not smoke. Smoking can increase your risk for health problems. If you need help quitting, talk to your doctor about stop-smoking programs and medicines. These can increase your chances of quitting for good. Limit alcohol to 2 drinks a day for men and 1 drink a day for women. Too much alcohol can cause health problems. If you have a BMI higher than 25  Your doctor may do other tests to check your risk for weight-related health problems. This may include measuring the distance around your waist. A waist measurement of more than 40 inches in men or 35 inches in women can increase the risk of weight-related health problems. Talk with your doctor about steps you can take to stay healthy or improve your health. You may need to make lifestyle changes to lose weight and stay healthy, such as changing your diet and getting regular exercise. If you have a BMI lower than 18.5  Your doctor may do other tests to check your risk for health problems. Talk with your doctor about steps you can take to stay healthy or improve your health. You may need to make lifestyle changes to gain or maintain weight and stay healthy, such as getting more healthy foods in your diet and doing exercises to build muscle. Where can you learn more? Go to http://lily-osiel.info/. Enter S176 in the search box to learn more about \"Body Mass Index: Care Instructions. \"  Current as of: October 13, 2016  Content Version: 11.4  © 7219-5615 [employer]. Care instructions adapted under license by [employer] (which disclaims liability or warranty for this information).  If you have questions about a medical condition or this instruction, always ask your healthcare professional. [place] [address] any warranty or liability for your use of this information.

## 2018-03-05 NOTE — PROGRESS NOTES
Internal Medicine Follow Up Visit Note    Chief Complaint   Patient presents with    Hypertension    Sleep Problem    Other     mood       HPI:  Floyd Carolina is a 41 y.o.  female with history significant for HTN and obesity is here for the above complaint(s). Hypertension: Today BP is uncontrolled. Taking HCTZ 25 mg daily, usually first thing sometimes mid-day, last dose yesterday. Increased stress related to home health, checking BP without being able to rest. No side effects from medications. Medication fair compliance. Denies headache, lightheadedness, dizziness, vision changes, chest pain, rapid/irregular heart rate, shortness of breath, cough, abdominal pain, leg swelling, leg pain. Limited exercise. Eating out a lot recently in last month. Outside office BP checks with readings averaging 140s/90s. After meditation for 6 minutes /86. Limited canned good and deli meats. Obesity: Up 5 lb since last visit. Not working on diet and exercise much. 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 Since then reports levels are about the same to a little bit worse. Does PT with home health. Rare meditation but when does it it is helpful. Does reading, prayer and relaxing. Drinking 1 glass of red wine a couple of days a week. For past 2 weeks several days of feeling nervous on edge, not being able to control worry, worrying too much, trouble relaxing, irritable mood, feeling afraid. Feels worry is there \"more often than not\" including to- do list. \"never get a break. \" Sleep no longer an issue. GYN: Breast discharge 2001, evaluated within 1 year of giving birth, evaluation negative for abnormality. Last PAP smear 2 years ago. ROS: as per HPI    Current Outpatient Prescriptions   Medication Sig    hydroCHLOROthiazide (HYDRODIURIL) 25 mg tablet Take 1 Tab by mouth daily for 180 days.     OTHER Taking for cramps and lighter menses, 1x per month Indications: [place] 1 tab monthly    levonorgestrel (MIRENA) 20 mcg/24 hr (5 years) IUD 1 Applicator. Starting 7/2015, 5 years    cholecalciferol, VITAMIN D3, (VITAMIN D3) 5,000 unit tab tablet Take 30,000 Units by mouth every seven (7) days. No current facility-administered medications for this visit. Health Maintenance   Topic Date Due    PAP AKA CERVICAL CYTOLOGY  01/01/2018    DTaP/Tdap/Td series (2 - Td) 03/05/2028    Influenza Age 9 to Adult  Addressed       There is no immunization history on file for this patient. Allergies and Medications: Reviewed and updated in EMR. Past Medical History:   Diagnosis Date    Hypertension 2009    Obesity (BMI 30.0-34.9) 2008    Vitamin D deficiency        Family History, Social History, Past Medical History, Surgical History: Reviewed and updated in EMR as appropriate. OBJECTIVE:   Visit Vitals    /79    Pulse 88    Temp 97.8 °F (36.6 °C) (Oral)    Resp 18    Ht 5' 5\" (1.651 m)    Wt 202 lb (91.6 kg)    LMP 02/19/2018    SpO2 100%  Comment: ra    BMI 33.61 kg/m2        BP Readings from Last 3 Encounters:   03/05/18 150/79   01/22/18 152/84     Wt Readings from Last 3 Encounters:   03/05/18 202 lb (91.6 kg)   01/22/18 197 lb 12.8 oz (89.7 kg)       General: Pleasant adult middle aged woman in no acute distress  HEENT: Head is atraumatic normo-cephalic. MSE: mood euthymic with affect congruent and reactive. No SI/HI. PHQ-9: 7, somewhat difficult  GREG-7: 6, somewhat difficult    Nursing Notes Reviewed.     LABS/RADIOLOGICAL TESTS:    Lab Results   Component Value Date/Time    WBC 6.3 02/09/2018 08:45 AM    HGB 12.9 02/09/2018 08:45 AM    HCT 40.2 02/09/2018 08:45 AM    PLATELET 205 33/15/1802 08:45 AM     Lab Results   Component Value Date/Time    Sodium 137 02/09/2018 08:45 AM    Potassium 3.5 02/09/2018 08:45 AM    Chloride 100 02/09/2018 08:45 AM    CO2 28 02/09/2018 08:45 AM    Glucose 93 02/09/2018 08:45 AM    BUN 9 02/09/2018 08:45 AM    Creatinine 0.88 02/09/2018 08:45 AM     Lab Results   Component Value Date/Time    Cholesterol, total 165 02/09/2018 08:45 AM    HDL Cholesterol 52 02/09/2018 08:45 AM    LDL, calculated 91.6 02/09/2018 08:45 AM    Triglyceride 107 02/09/2018 08:45 AM     No results found for: GPT    All lab results and radiological studies were reviewed and discussed with the patient. ASSESSMENT/PLAN:      ICD-10-CM ICD-9-CM    1. Essential hypertension I10 401.9 hydroCHLOROthiazide (HYDRODIURIL) 25 mg tablet  DASH diet  exercise   2. Anxiety F41.9 300.00 Meditation, exercise, bhvr techniques  RTC in 6 weeks discuss med/therapy   3. Obesity (BMI 30.0-34. 9) E66.9 278.00 Diet and exercise discussed  BMI handout given       Requested Prescriptions     Signed Prescriptions Disp Refills    hydroCHLOROthiazide (HYDRODIURIL) 25 mg tablet 30 Tab 5     Sig: Take 1 Tab by mouth daily for 180 days. Patient verbalized understanding and agreement with the plan. Patient was given an after-visit summary. Follow-up Disposition:  Return in about 6 weeks (around 4/16/2018), or if symptoms worsen or fail to improve, for HTN, anxiety. or sooner if worsening symptoms. More than 50% of this 15 min visit was spent counseling the patient face to face about etiology and treatment of health conditions outlined in assessment and plan. Nikolas Cosby M.D.   [address], [address] [place], [place] [address] [address] [place] [address] [phone]

## 2018-03-05 NOTE — PROGRESS NOTES
.  Chief Complaint   Patient presents with    Hypertension    Sleep Problem    Other     mood     1. Have you been to the ER, urgent care clinic since your last visit? Hospitalized since your last visit? No    2. Have you seen or consulted any other health care providers outside of the [address] [place] [address] since your last visit? Include any pap smears or colon screening.  No

## 2018-03-06 DIAGNOSIS — I10 ESSENTIAL HYPERTENSION: ICD-10-CM

## 2018-03-06 RX ORDER — HYDROCHLOROTHIAZIDE 25 MG/1
25 TABLET ORAL DAILY
Qty: 90 TAB | Refills: 1 | Status: SHIPPED | OUTPATIENT
Start: 2018-03-06 | End: 2018-08-02 | Stop reason: SDUPTHER

## 2018-04-16 ENCOUNTER — OFFICE VISIT (OUTPATIENT)
Dept: FAMILY MEDICINE CLINIC | Age: 48
End: 2018-04-16

## 2018-04-16 VITALS
OXYGEN SATURATION: 100 % | DIASTOLIC BLOOD PRESSURE: 92 MMHG | HEIGHT: 65 IN | WEIGHT: 206 LBS | TEMPERATURE: 98.7 F | RESPIRATION RATE: 16 BRPM | HEART RATE: 83 BPM | BODY MASS INDEX: 34.32 KG/M2 | SYSTOLIC BLOOD PRESSURE: 158 MMHG

## 2018-04-16 DIAGNOSIS — E66.9 OBESITY (BMI 30.0-34.9): ICD-10-CM

## 2018-04-16 DIAGNOSIS — I10 ESSENTIAL HYPERTENSION: Primary | ICD-10-CM

## 2018-04-16 DIAGNOSIS — F41.9 ANXIETY: ICD-10-CM

## 2018-04-16 RX ORDER — LISINOPRIL 10 MG/1
10 TABLET ORAL DAILY
Qty: 30 TAB | Refills: 2 | Status: SHIPPED | OUTPATIENT
Start: 2018-04-16 | End: 2018-06-18

## 2018-04-16 NOTE — PROGRESS NOTES
Chief Complaint   Patient presents with    Hypertension    Anxiety     1. Have you been to the ER, urgent care clinic since your last visit? Hospitalized since your last visit? No    2. Have you seen or consulted any other health care providers outside of the [address] [place] [address] since your last visit? Include any pap smears or colon screening.  No

## 2018-04-16 NOTE — PATIENT INSTRUCTIONS
For Blood Pressure;  Start taking lisinopril 10 mg daily in mornign with HCTZ 25 mg  Return in 2-3 weeks for blood draw to monitor kidney function, no need to fast or schedule an appointment, just show up  continue with 2 liters of water per day  Max salt 2 grams per 24 hours  Check blood pressure mid-day 2-3  x per week and write down  Make sure you are seated for at least  5-10 minutes, legs uncrossed and back resting  Bring log to clinic next visit    For Mood:  Continue to work on exercise and meditation  Consider starting to get involved in previous enjoyed activities 1 day per week  Including spending time with friends, joining a meet-up related to activities you enjoy, reading in the park, going to movies, going to [place] more, riding bike. Return in 4 weeks      Weight Loss:  Eat breakfast every day  Write down foods you eat every day for 1 week  Bring log of food to nutritionist  We are sending a referral to nutritionist . America Quiles should be called about this in 2-3 weeks. If no word in 3 weeks please give us a call to follow up. Continue working on exercise 30 minutes a day 4-5 days per week       DASH Diet: Care Instructions  Your Care Instructions    The DASH diet is an eating plan that can help lower your blood pressure. DASH stands for Dietary Approaches to Stop Hypertension. Hypertension is high blood pressure. The DASH diet focuses on eating foods that are high in calcium, potassium, and magnesium. These nutrients can lower blood pressure. The foods that are highest in these nutrients are fruits, vegetables, low-fat dairy products, nuts, seeds, and legumes. But taking calcium, potassium, and magnesium supplements instead of eating foods that are high in those nutrients does not have the same effect. The DASH diet also includes whole grains, fish, and poultry. The DASH diet is one of several lifestyle changes your doctor may recommend to lower your high blood pressure.  Your doctor may also want you to decrease the amount of sodium in your diet. Lowering sodium while following the DASH diet can lower blood pressure even further than just the DASH diet alone. Follow-up care is a key part of your treatment and safety. Be sure to make and go to all appointments, and call your doctor if you are having problems. It's also a good idea to know your test results and keep a list of the medicines you take. How can you care for yourself at home? Following the DASH diet  · Eat 4 to 5 servings of fruit each day. A serving is 1 medium-sized piece of fruit, ½ cup chopped or canned fruit, 1/4 cup dried fruit, or 4 ounces (½ cup) of fruit juice. Choose fruit more often than fruit juice. · Eat 4 to 5 servings of vegetables each day. A serving is 1 cup of lettuce or raw leafy vegetables, ½ cup of chopped or cooked vegetables, or 4 ounces (½ cup) of vegetable juice. Choose vegetables more often than vegetable juice. · Get 2 to 3 servings of low-fat and fat-free dairy each day. A serving is 8 ounces of milk, 1 cup of yogurt, or 1 ½ ounces of cheese. · Eat 6 to 8 servings of grains each day. A serving is 1 slice of bread, 1 ounce of dry cereal, or ½ cup of cooked rice, pasta, or cooked cereal. Try to choose whole-grain products as much as possible. · Limit lean meat, poultry, and fish to 2 servings each day. A serving is 3 ounces, about the size of a deck of cards. · Eat 4 to 5 servings of nuts, seeds, and legumes (cooked dried beans, lentils, and split peas) each week. A serving is 1/3 cup of nuts, 2 tablespoons of seeds, or ½ cup of cooked beans or peas. · Limit fats and oils to 2 to 3 servings each day. A serving is 1 teaspoon of vegetable oil or 2 tablespoons of salad dressing. · Limit sweets and added sugars to 5 servings or less a week. A serving is 1 tablespoon jelly or jam, ½ cup sorbet, or 1 cup of lemonade. · Eat less than 2,300 milligrams (mg) of sodium a day.  If you limit your sodium to 1,500 mg a day, you can lower your blood pressure even more. Tips for success  · Start small. Do not try to make dramatic changes to your diet all at once. You might feel that you are missing out on your favorite foods and then be more likely to not follow the plan. Make small changes, and stick with them. Once those changes become habit, add a few more changes. · Try some of the following:  ¨ Make it a goal to eat a fruit or vegetable at every meal and at snacks. This will make it easy to get the recommended amount of fruits and vegetables each day. ¨ Try yogurt topped with fruit and nuts for a snack or healthy dessert. ¨ Add lettuce, tomato, cucumber, and onion to sandwiches. ¨ Combine a ready-made pizza crust with low-fat mozzarella cheese and lots of vegetable toppings. Try using tomatoes, squash, spinach, broccoli, carrots, cauliflower, and onions. ¨ Have a variety of cut-up vegetables with a low-fat dip as an appetizer instead of chips and dip. ¨ Sprinkle sunflower seeds or chopped almonds over salads. Or try adding chopped walnuts or almonds to cooked vegetables. ¨ Try some vegetarian meals using beans and peas. Add garbanzo or kidney beans to salads. Make burritos and tacos with mashed aaron beans or black beans. Where can you learn more? Go to http://lily-osiel.info/. Enter R171 in the search box to learn more about \"DASH Diet: Care Instructions. \"  Current as of: September 21, 2016  Content Version: 11.4  © 5595-7583 [employer]. Care instructions adapted under license by [employer] (which disclaims liability or warranty for this information). If you have questions about a medical condition or this instruction, always ask your healthcare professional. [place] [address] any warranty or liability for your use of this information. Lisinopril (By mouth)   Lisinopril (lye-SIN-oh-pril)  Treats high blood pressure and heart failure.  Also given to reduce the risk of death after a heart attack. This medicine is an ACE inhibitor. Brand Name(s): Prinivil, Qbrelis, Zestril   There may be other brand names for this medicine. When This Medicine Should Not Be Used: This medicine is not right for everyone. Do not use it if you had an allergic reaction to lisinopril or another ACE inhibitor, or if you are pregnant. How to Use This Medicine:   Liquid, Tablet  · Take your medicine as directed. Your dose may need to be changed several times to find what works best for you. · Oral liquid: Measure the oral liquid medicine with a marked measuring spoon, oral syringe, or medicine cup. · Missed dose: Take a dose as soon as you remember. If it is almost time for your next dose, wait until then and take a regular dose. Do not take extra medicine to make up for a missed dose. · Store the medicine in a closed container at room temperature, away from heat, moisture, and direct light. Drugs and Foods to Avoid:   Ask your doctor or pharmacist before using any other medicine, including over-the-counter medicines, vitamins, and herbal products. · Do not use this medicine together with aliskiren if you have diabetes. · Some foods and medicines may affect how lisinopril works. Tell your doctor if you are using any of the following:   ¨ Aliskiren, everolimus, lithium, sirolimus, temsirolimus  ¨ Another blood pressure medicine, including an angiotensin receptor blocker (ARB)  ¨ Diuretic (water pill, including amiloride, spironolactone, triamterene)  ¨ Insulin or diabetes medicine  ¨ NSAID pain or arthritis medicine (including aspirin, celecoxib, diclofenac, ibuprofen, naproxen)  · Ask your doctor before you use any medicine, supplement, or salt substitute that contains potassium. Warnings While Using This Medicine:   · It is not safe to take this medicine during pregnancy. It could harm an unborn baby. Tell your doctor right away if you become pregnant.   · Tell your doctor if you are breastfeeding, or if you have kidney disease, liver disease, diabetes, or heart or blood vessel disease. · This medicine may cause the following problems:  ¨ Angioedema (severe swelling)  ¨ Kidney problems  ¨ Serious liver problems  · This medicine could lower your blood pressure too much, especially when you first use it or if you are dehydrated. Stand or sit up slowly if you feel lightheaded or dizzy. · Do not stop using this medicine without asking your doctor, even if you feel well. This medicine will not cure your high blood pressure, but it will help keep it in a normal range. You may have to take blood pressure medicine for the rest of your life. · Tell any doctor or dentist who treats you that you are using this medicine. · Your doctor will do lab tests at regular visits to check on the effects of this medicine. Keep all appointments. · Keep all medicine out of the reach of children. Never share your medicine with anyone. Possible Side Effects While Using This Medicine:   Call your doctor right away if you notice any of these side effects:  · Allergic reaction: Itching or hives, swelling in your face or hands, swelling or tingling in your mouth or throat, chest tightness, trouble breathing  · Blistering, peeling, or red skin rash  · Change in how much or how often you urinate  · Confusion, weakness, uneven heartbeat, trouble breathing, numbness or tingling in your hands, feet, or lips  · Dark urine or pale stools, nausea, vomiting, loss of appetite, stomach pain, yellow skin or eyes  · Fever, chills, sore throat, body aches  · Lightheadedness, dizziness, fainting  · Severe stomach pain (with or without nausea or vomiting)  If you notice these less serious side effects, talk with your doctor:   · Dry cough  If you notice other side effects that you think are caused by this medicine, tell your doctor. Call your doctor for medical advice about side effects.  You may report side effects to FDA at 1-800-FDA-1088  © 2017 [facility] Information is for End User's use only and may not be sold, redistributed or otherwise used for commercial purposes. The above information is an  only. It is not intended as medical advice for individual conditions or treatments. Talk to your doctor, nurse or pharmacist before following any medical regimen to see if it is safe and effective for you.

## 2018-04-16 NOTE — MR AVS SNAPSHOT
[facility] 
 
 
 [address] [facility] [address] [address] 
[phone] Patient: Dominic Fulton MRN: [id number] :1970 Visit Information Date & Time Provider Department Dept. Phone Encounter #  
 2018  8:30 AM Gregorio Ryan MD [address] [phone] [id number] Follow-up Instructions Return in about 4 weeks (around 2018), or if symptoms worsen or fail to improve, for BP, obesity. Upcoming Health Maintenance Date Due  
 PAP AKA CERVICAL CYTOLOGY 2021 DTaP/Tdap/Td series (2 - Td) 3/5/2028 Allergies as of 2018  Review Complete On: 2018 By: Gregorio Ryan MD  
  
 Severity Noted Reaction Type Reactions Codeine  2018    Nausea and Vomiting Current Immunizations  Never Reviewed No immunizations on file. Not reviewed this visit You Were Diagnosed With   
  
 Codes Comments Essential hypertension    -  Primary ICD-10-CM: I10 
ICD-9-CM: 401.9 Anxiety     ICD-10-CM: F41.9 ICD-9-CM: 300.00 Obesity (BMI 30.0-34.9)     ICD-10-CM: F91.8 ICD-9-CM: 278.00 Vitals BP Pulse Temp Resp Height(growth percentile) Weight(growth percentile) (!) 158/92 83 98.7 °F (37.1 °C) (Oral) 16 5' 5\" (1.651 m) 206 lb (93.4 kg) SpO2 BMI OB Status Smoking Status 100% 34.28 kg/m2 Having regular periods Never Smoker Vitals History BMI and BSA Data Body Mass Index Body Surface Area  
 34.28 kg/m 2 2.07 m 2 Preferred Pharmacy Pharmacy Name Phone [id number] [phone]  [phone] Your Updated Medication List  
  
   
This list is accurate as of 18  9:12 AM.  Always use your most recent med list.  
  
  
  
  
 cholecalciferol (VITAMIN D3) 5,000 unit Tab tablet Commonly known as:  VITAMIN D3 Take 30,000 Units by mouth every seven (7) days. hydroCHLOROthiazide 25 mg tablet Commonly known as:  HYDRODIURIL Take 1 Tab by mouth daily for 180 days. lisinopril 10 mg tablet Commonly known as:  Joe Little Take 1 Tab by mouth daily for 90 days. MIRENA 20 mcg/24 hr (5 years) Iud  
Generic drug:  levonorgestrel 1 Applicator. Starting 7/2015, 5 years OTHER Taking for cramps and lighter menses, 1x per month  Indications: Linwood Carlisle 1 tab monthly Prescriptions Sent to Pharmacy Refills  
 lisinopril (PRINIVIL, ZESTRIL) 10 mg tablet 2 Sig: Take 1 Tab by mouth daily for 90 days. Class: Normal  
 Pharmacy: [id number]- [phone] [facility] #: [phone] Route: Oral  
  
We Performed the Following REFERRAL TO NUTRITION [REF50 Custom] Comments:  
 Please evaluate patient for weight loss recommendation for obesity Please schedule and authorize patient for services at least 4-6 sessions. Would like to have at [address] weight loss nutritionist  
  
Follow-up Instructions Return in about 4 weeks (around 5/14/2018), or if symptoms worsen or fail to improve, for BP, obesity. To-Do List   
 04/16/2018 Lab:  METABOLIC PANEL, BASIC Referral Information Referral ID Referred By Referred To  
  
 [id number] Nikolas Winn Not Available Visits Status Start Date End Date 1 New Request 4/16/18 4/16/19 If your referral has a status of pending review or denied, additional information will be sent to support the outcome of this decision. Patient Instructions For Blood Pressure; 
Start taking lisinopril 10 mg daily in mornign with HCTZ 25 mg Return in 2-3 weeks for blood draw to monitor kidney function, no need to fast or schedule an appointment, just show up 
continue with 2 liters of water per day Max salt 2 grams per 24 hours Check blood pressure mid-day 2-3  x per week and write down Make sure you are seated for at least  5-10 minutes, legs uncrossed and back resting Bring log to clinic next visit For Mood: 
Continue to work on exercise and meditation Consider starting to get involved in previous enjoyed activities 1 day per week Including spending time with friends, joining a meet-up related to activities you enjoy, reading in the park, going to movies, going to [email or web address] more, riding bike. Return in 4 weeks Weight Loss: 
Eat breakfast every day Write down foods you eat every day for 1 week Bring log of food to nutritionist 
We are sending a referral to nutritionist . Meri Lyons should be called about this in 2-3 weeks. If no word in 3 weeks please give us a call to follow up. Continue working on exercise 30 minutes a day 4-5 days per week DASH Diet: Care Instructions Your Care Instructions The DASH diet is an eating plan that can help lower your blood pressure. DASH stands for Dietary Approaches to Stop Hypertension. Hypertension is high blood pressure. The DASH diet focuses on eating foods that are high in calcium, potassium, and magnesium. These nutrients can lower blood pressure. The foods that are highest in these nutrients are fruits, vegetables, low-fat dairy products, nuts, seeds, and legumes. But taking calcium, potassium, and magnesium supplements instead of eating foods that are high in those nutrients does not have the same effect. The DASH diet also includes whole grains, fish, and poultry. The DASH diet is one of several lifestyle changes your doctor may recommend to lower your high blood pressure. Your doctor may also want you to decrease the amount of sodium in your diet. Lowering sodium while following the DASH diet can lower blood pressure even further than just the DASH diet alone. Follow-up care is a key part of your treatment and safety.  Be sure to make and go to all appointments, and call your doctor if you are having problems. It's also a good idea to know your test results and keep a list of the medicines you take. How can you care for yourself at home? Following the DASH diet · Eat 4 to 5 servings of fruit each day. A serving is 1 medium-sized piece of fruit, ½ cup chopped or canned fruit, 1/4 cup dried fruit, or 4 ounces (½ cup) of fruit juice. Choose fruit more often than fruit juice. · Eat 4 to 5 servings of vegetables each day. A serving is 1 cup of lettuce or raw leafy vegetables, ½ cup of chopped or cooked vegetables, or 4 ounces (½ cup) of vegetable juice. Choose vegetables more often than vegetable juice. · Get 2 to 3 servings of low-fat and fat-free dairy each day. A serving is 8 ounces of milk, 1 cup of yogurt, or 1 ½ ounces of cheese. · Eat 6 to 8 servings of grains each day. A serving is 1 slice of bread, 1 ounce of dry cereal, or ½ cup of cooked rice, pasta, or cooked cereal. Try to choose whole-grain products as much as possible. · Limit lean meat, poultry, and fish to 2 servings each day. A serving is 3 ounces, about the size of a deck of cards. · Eat 4 to 5 servings of nuts, seeds, and legumes (cooked dried beans, lentils, and split peas) each week. A serving is 1/3 cup of nuts, 2 tablespoons of seeds, or ½ cup of cooked beans or peas. · Limit fats and oils to 2 to 3 servings each day. A serving is 1 teaspoon of vegetable oil or 2 tablespoons of salad dressing. · Limit sweets and added sugars to 5 servings or less a week. A serving is 1 tablespoon jelly or jam, ½ cup sorbet, or 1 cup of lemonade. · Eat less than 2,300 milligrams (mg) of sodium a day. If you limit your sodium to 1,500 mg a day, you can lower your blood pressure even more. Tips for success · Start small. Do not try to make dramatic changes to your diet all at once. You might feel that you are missing out on your favorite foods and then be more likely to not follow the plan.  Make small changes, and stick with them. Once those changes become habit, add a few more changes. · Try some of the following: ¨ Make it a goal to eat a fruit or vegetable at every meal and at snacks. This will make it easy to get the recommended amount of fruits and vegetables each day. ¨ Try yogurt topped with fruit and nuts for a snack or healthy dessert. ¨ Add lettuce, tomato, cucumber, and onion to sandwiches. ¨ Combine a ready-made pizza crust with low-fat mozzarella cheese and lots of vegetable toppings. Try using tomatoes, squash, spinach, broccoli, carrots, cauliflower, and onions. ¨ Have a variety of cut-up vegetables with a low-fat dip as an appetizer instead of chips and dip. ¨ Sprinkle sunflower seeds or chopped almonds over salads. Or try adding chopped walnuts or almonds to cooked vegetables. ¨ Try some vegetarian meals using beans and peas. Add garbanzo or kidney beans to salads. Make burritos and tacos with mashed aaron beans or black beans. Where can you learn more? Go to http://[employer].info/. Enter T220 in the search box to learn more about \"DASH Diet: Care Instructions. \" Current as of: September 21, 2016 Content Version: 11.4 © 0485-9250 [employer]. Care instructions adapted under license by [employer] (which disclaims liability or warranty for this information). If you have questions about a medical condition or this instruction, always ask your healthcare professional. [place] [address] any warranty or liability for your use of this information. Lisinopril (By mouth) Lisinopril (lye-SIN-oh-pril) Treats high blood pressure and heart failure. Also given to reduce the risk of death after a heart attack. This medicine is an ACE inhibitor. Brand Name(s): Prinivil, Qbrelis, Zestril There may be other brand names for this medicine. When This Medicine Should Not Be Used: This medicine is not right for everyone.  Do not use it if you had an allergic reaction to lisinopril or another ACE inhibitor, or if you are pregnant. How to Use This Medicine:  
Liquid, Tablet · Take your medicine as directed. Your dose may need to be changed several times to find what works best for you. · Oral liquid: Measure the oral liquid medicine with a marked measuring spoon, oral syringe, or medicine cup. · Missed dose: Take a dose as soon as you remember. If it is almost time for your next dose, wait until then and take a regular dose. Do not take extra medicine to make up for a missed dose. · Store the medicine in a closed container at room temperature, away from heat, moisture, and direct light. Drugs and Foods to Avoid: Ask your doctor or pharmacist before using any other medicine, including over-the-counter medicines, vitamins, and herbal products. · Do not use this medicine together with aliskiren if you have diabetes. · Some foods and medicines may affect how lisinopril works. Tell your doctor if you are using any of the following: ¨ Aliskiren, everolimus, lithium, sirolimus, temsirolimus ¨ Another blood pressure medicine, including an angiotensin receptor blocker (ARB) ¨ Diuretic (water pill, including amiloride, spironolactone, triamterene) ¨ Insulin or diabetes medicine ¨ NSAID pain or arthritis medicine (including aspirin, celecoxib, diclofenac, ibuprofen, naproxen) · Ask your doctor before you use any medicine, supplement, or salt substitute that contains potassium. Warnings While Using This Medicine: · It is not safe to take this medicine during pregnancy. It could harm an unborn baby. Tell your doctor right away if you become pregnant. · Tell your doctor if you are breastfeeding, or if you have kidney disease, liver disease, diabetes, or heart or blood vessel disease. · This medicine may cause the following problems: ¨ Angioedema (severe swelling) ¨ Kidney problems ¨ Serious liver problems · This medicine could lower your blood pressure too much, especially when you first use it or if you are dehydrated. Stand or sit up slowly if you feel lightheaded or dizzy. · Do not stop using this medicine without asking your doctor, even if you feel well. This medicine will not cure your high blood pressure, but it will help keep it in a normal range. You may have to take blood pressure medicine for the rest of your life. · Tell any doctor or dentist who treats you that you are using this medicine. · Your doctor will do lab tests at regular visits to check on the effects of this medicine. Keep all appointments. · Keep all medicine out of the reach of children. Never share your medicine with anyone. Possible Side Effects While Using This Medicine:  
Call your doctor right away if you notice any of these side effects: · Allergic reaction: Itching or hives, swelling in your face or hands, swelling or tingling in your mouth or throat, chest tightness, trouble breathing · Blistering, peeling, or red skin rash · Change in how much or how often you urinate · Confusion, weakness, uneven heartbeat, trouble breathing, numbness or tingling in your hands, feet, or lips · Dark urine or pale stools, nausea, vomiting, loss of appetite, stomach pain, yellow skin or eyes · Fever, chills, sore throat, body aches · Lightheadedness, dizziness, fainting · Severe stomach pain (with or without nausea or vomiting) If you notice these less serious side effects, talk with your doctor: · Dry cough If you notice other side effects that you think are caused by this medicine, tell your doctor. Call your doctor for medical advice about side effects. You may report side effects to FDA at 7-121-FDA-7348 © 2017 [address] Information is for End User's use only and may not be sold, redistributed or otherwise used for commercial purposes. The above information is an  only.  It is not intended as medical advice for individual conditions or treatments. Talk to your doctor, nurse or pharmacist before following any medical regimen to see if it is safe and effective for you. Introducing [facility]! Fany Hitchcock introduces [employer] mobile patient portal. Now you can access parts of your medical record, email your doctor's office, and request medication refills online. 1. In your internet browser, go to [email or web address] 2. Click on the First Time User? Click Here link in the Sign In box. You will see the New Member Sign Up page. 3. Enter your [employer] mobile Access Code exactly as it appears below. You will not need to use this code after youve completed the sign-up process. If you do not sign up before the expiration date, you must request a new code. · [employer] mobile Access Code: [email or web address] Expires: 4/22/2018 10:34 AM 
 
4. Enter the last four digits of your Social Security Number (xxxx) and Date of Birth (mm/dd/yyyy) as indicated and click Submit. You will be taken to the next sign-up page. 5. Create a [employer] mobile ID. This will be your [employer] mobile login ID and cannot be changed, so think of one that is secure and easy to remember. 6. Create a [employer] mobile password. You can change your password at any time. 7. Enter your Password Reset Question and Answer. This can be used at a later time if you forget your password. 8. Enter your e-mail address. You will receive e-mail notification when new information is available in [address] 9. Click Sign Up. You can now view and download portions of your medical record. 10. Click the Download Summary menu link to download a portable copy of your medical information. If you have questions, please visit the Frequently Asked Questions section of the [employer] mobile website. Remember, [employer] mobile is NOT to be used for urgent needs. For medical emergencies, dial 911. Now available from your iPhone and Android! Please provide this summary of care documentation to your next provider. Your primary care clinician is listed as Ryan Grant. If you have any questions after today's visit, please call [phone].

## 2018-05-09 ENCOUNTER — HOSPITAL ENCOUNTER (OUTPATIENT)
Dept: LAB | Age: 48
Discharge: HOME | End: 2018-05-09
Payer: COMMERCIAL

## 2018-05-09 DIAGNOSIS — I10 ESSENTIAL HYPERTENSION: ICD-10-CM

## 2018-05-09 LAB
ANION GAP SERPL CALC-SCNC: 8 MMOL/L (ref 3–18)
BUN SERPL-MCNC: 9 MG/DL (ref 7–18)
BUN/CREAT SERPL: 10 (ref 12–20)
CALCIUM SERPL-MCNC: 9.2 MG/DL (ref 8.5–10.1)
CHLORIDE SERPL-SCNC: 104 MMOL/L (ref 100–108)
CO2 SERPL-SCNC: 27 MMOL/L (ref 21–32)
CREAT SERPL-MCNC: 0.93 MG/DL (ref 0.6–1.3)
GLUCOSE SERPL-MCNC: 96 MG/DL (ref 74–99)
POTASSIUM SERPL-SCNC: 4.3 MMOL/L (ref 3.5–5.5)
SODIUM SERPL-SCNC: 139 MMOL/L (ref 136–145)

## 2018-05-09 PROCEDURE — 80048 BASIC METABOLIC PNL TOTAL CA: CPT | Performed by: INTERNAL MEDICINE

## 2018-06-18 ENCOUNTER — OFFICE VISIT (OUTPATIENT)
Dept: FAMILY MEDICINE CLINIC | Age: 48
End: 2018-06-18

## 2018-06-18 VITALS
TEMPERATURE: 97.9 F | RESPIRATION RATE: 16 BRPM | SYSTOLIC BLOOD PRESSURE: 139 MMHG | HEIGHT: 65 IN | BODY MASS INDEX: 33.66 KG/M2 | OXYGEN SATURATION: 98 % | WEIGHT: 202 LBS | DIASTOLIC BLOOD PRESSURE: 82 MMHG | HEART RATE: 74 BPM

## 2018-06-18 DIAGNOSIS — I10 ESSENTIAL HYPERTENSION: Primary | ICD-10-CM

## 2018-06-18 DIAGNOSIS — E66.9 OBESITY (BMI 30.0-34.9): ICD-10-CM

## 2018-06-18 RX ORDER — ASPIRIN 81 MG/1
TABLET ORAL DAILY
COMMUNITY

## 2018-06-18 RX ORDER — LOSARTAN POTASSIUM 25 MG/1
25 TABLET ORAL DAILY
Qty: 30 TAB | Refills: 1 | Status: SHIPPED | OUTPATIENT
Start: 2018-06-18 | End: 2018-08-02 | Stop reason: SDUPTHER

## 2018-06-18 NOTE — PATIENT INSTRUCTIONS
Start taking losartan 25 mg daily with HCTZ 25 mg  Call [facility] to get refill of HCTZ  Return in 2-3 weeks for blood draw to monitor kidney function, no need to fast or schedule an appointment, just show up  continue with 2 - 2.5 liters of water per day  Max salt 2 grams per 24 hours  Check blood pressure mid-day 1-2  x per week and write down  Make sure you are seated for at least  5-10 minutes, legs uncrossed and back resting  Bring log to clinic next visit         DASH Diet: Care Instructions  Your Care Instructions    The DASH diet is an eating plan that can help lower your blood pressure. DASH stands for Dietary Approaches to Stop Hypertension. Hypertension is high blood pressure. The DASH diet focuses on eating foods that are high in calcium, potassium, and magnesium. These nutrients can lower blood pressure. The foods that are highest in these nutrients are fruits, vegetables, low-fat dairy products, nuts, seeds, and legumes. But taking calcium, potassium, and magnesium supplements instead of eating foods that are high in those nutrients does not have the same effect. The DASH diet also includes whole grains, fish, and poultry. The DASH diet is one of several lifestyle changes your doctor may recommend to lower your high blood pressure. Your doctor may also want you to decrease the amount of sodium in your diet. Lowering sodium while following the DASH diet can lower blood pressure even further than just the DASH diet alone. Follow-up care is a key part of your treatment and safety. Be sure to make and go to all appointments, and call your doctor if you are having problems. It's also a good idea to know your test results and keep a list of the medicines you take. How can you care for yourself at home? Following the DASH diet  · Eat 4 to 5 servings of fruit each day. A serving is 1 medium-sized piece of fruit, ½ cup chopped or canned fruit, 1/4 cup dried fruit, or 4 ounces (½ cup) of fruit juice.  Choose fruit more often than fruit juice. · Eat 4 to 5 servings of vegetables each day. A serving is 1 cup of lettuce or raw leafy vegetables, ½ cup of chopped or cooked vegetables, or 4 ounces (½ cup) of vegetable juice. Choose vegetables more often than vegetable juice. · Get 2 to 3 servings of low-fat and fat-free dairy each day. A serving is 8 ounces of milk, 1 cup of yogurt, or 1 ½ ounces of cheese. · Eat 6 to 8 servings of grains each day. A serving is 1 slice of bread, 1 ounce of dry cereal, or ½ cup of cooked rice, pasta, or cooked cereal. Try to choose whole-grain products as much as possible. · Limit lean meat, poultry, and fish to 2 servings each day. A serving is 3 ounces, about the size of a deck of cards. · Eat 4 to 5 servings of nuts, seeds, and legumes (cooked dried beans, lentils, and split peas) each week. A serving is 1/3 cup of nuts, 2 tablespoons of seeds, or ½ cup of cooked beans or peas. · Limit fats and oils to 2 to 3 servings each day. A serving is 1 teaspoon of vegetable oil or 2 tablespoons of salad dressing. · Limit sweets and added sugars to 5 servings or less a week. A serving is 1 tablespoon jelly or jam, ½ cup sorbet, or 1 cup of lemonade. · Eat less than 2,300 milligrams (mg) of sodium a day. If you limit your sodium to 1,500 mg a day, you can lower your blood pressure even more. Tips for success  · Start small. Do not try to make dramatic changes to your diet all at once. You might feel that you are missing out on your favorite foods and then be more likely to not follow the plan. Make small changes, and stick with them. Once those changes become habit, add a few more changes. · Try some of the following:  ¨ Make it a goal to eat a fruit or vegetable at every meal and at snacks. This will make it easy to get the recommended amount of fruits and vegetables each day. ¨ Try yogurt topped with fruit and nuts for a snack or healthy dessert.   ¨ Add lettuce, tomato, cucumber, and onion to sandwiches. ¨ Combine a ready-made pizza crust with low-fat mozzarella cheese and lots of vegetable toppings. Try using tomatoes, squash, spinach, broccoli, carrots, cauliflower, and onions. ¨ Have a variety of cut-up vegetables with a low-fat dip as an appetizer instead of chips and dip. ¨ Sprinkle sunflower seeds or chopped almonds over salads. Or try adding chopped walnuts or almonds to cooked vegetables. ¨ Try some vegetarian meals using beans and peas. Add garbanzo or kidney beans to salads. Make burritos and tacos with mashed aaron beans or black beans. Where can you learn more? Go to http://lily-osiel.info/. Enter B063 in the search box to learn more about \"DASH Diet: Care Instructions. \"  Current as of: September 21, 2016  Content Version: 11.4  © 5307-0007 [employer]. Care instructions adapted under license by [employer] (which disclaims liability or warranty for this information). If you have questions about a medical condition or this instruction, always ask your healthcare professional. [place] [address] any warranty or liability for your use of this information. Losartan (By mouth)   Losartan (elicia-JHOANA-tan)  Treats high blood pressure. Reduces the risk of stroke in patients with high blood pressure and an enlarged heart. Treats kidney disease in patients with diabetes. This medicine is an angiotensin receptor blocker (ARB). Brand Name(s): Cozaar   There may be other brand names for this medicine. When This Medicine Should Not Be Used: This medicine is not right for everyone. Do not use it if you had an allergic reaction to losartan, or if you are pregnant. Do not use this medicine together with aliskiren if you have diabetes. How to Use This Medicine:   Tablet  · Take your medicine as directed. Your dose may need to be changed several times to find what works best for you.   · Drink plenty of fluids if you exercise, sweat more than usual, or have diarrhea or vomiting. · Read and follow the patient instructions that come with this medicine. Talk to your doctor or pharmacist if you have any questions. · Missed dose: Take a dose as soon as you remember. If it is almost time for your next dose, wait until then and take a regular dose. Do not take extra medicine to make up for a missed dose. · Store the medicine in a closed container at room temperature, away from heat, moisture, and direct light. Drugs and Foods to Avoid:   Ask your doctor or pharmacist before using any other medicine, including over-the-counter medicines, vitamins, and herbal products. · Some medicines can affect how losartan works. Tell your doctor if you are using any of the following:   ¨ Lithium  ¨ Rifampin  ¨ A diuretic (water pill), such as spironolactone, triamterene, or amiloride  ¨ NSAID pain or arthritis medicine, such as aspirin, diclofenac, ibuprofen, or naproxen  ¨ Another blood pressure medicine, such as aliskiren, benazepril, enalapril, or lisinopril  · Ask your doctor before you use any medicine, supplement, or salt substitute that contains potassium. Warnings While Using This Medicine:   · It is not safe to take this medicine during pregnancy. It could harm an unborn baby. Tell your doctor right away if you become pregnant. · Tell your doctor if you are breastfeeding, or if you have kidney disease, liver disease, congestive heart failure, or diabetes. Tell your doctor if you have had angioedema that was caused by a blood pressure medicine. · This medicine could lower your blood pressure too much, especially when you first use it or if you are dehydrated. Stand or sit up slowly if you feel lightheaded or dizzy. · Your doctor will do lab tests at regular visits to check on the effects of this medicine. Keep all appointments. · Keep all medicine out of the reach of children. Never share your medicine with anyone.   Possible Side Effects While Using This Medicine:   Call your doctor right away if you notice any of these side effects:  · Allergic reaction: Itching or hives, swelling in your face or hands, swelling or tingling in your mouth or throat, chest tightness, trouble breathing  · Change in how much or how often you urinate  · Confusion, weakness, uneven heartbeat, trouble breathing, numbness or tingling in your hands, feet, or lips  · Lightheadedness, dizziness, fainting  · Rapid weight gain, swelling in your hands, ankles, or feet  If you notice these less serious side effects, talk with your doctor:   · Diarrhea  · Tiredness  If you notice other side effects that you think are caused by this medicine, tell your doctor. Call your doctor for medical advice about side effects. You may report side effects to FDA at 2-433-FDA-5226  © 2017 [address] Information is for End User's use only and may not be sold, redistributed or otherwise used for commercial purposes. The above information is an  only. It is not intended as medical advice for individual conditions or treatments.  Talk to your doctor, nurse or pharmacist before following any medical regimen to see if it is safe and effective for you.

## 2018-06-18 NOTE — PROGRESS NOTES
Internal Medicine Follow Up Visit Note    Chief Complaint   Patient presents with    Hypertension       HPI:  Pascual Stanley is a 41 y.o.  female with history significant for HTN, obesity is here for the above complaint(s). Hypertension: Today BP is controlled. Taking Lisinopril 10 HCT 25 mg every night, some sleepiness . Tiredness side effects from medications so takes at night which helps. Medication good compliance. Also coughing at night after start of lisinopril. Denies lightheadedness, dizziness, vision changes, chest pain, rapid/irregular heart rate, shortness of breath, abdominal pain, leg swelling, leg pain. Working on low salt diet, no exercise. Does check BP outside of office with readings averaging 130s/70s-80s. Slight headache today, 1 bottle water 1/2. Water intake per day 2-3 bottles lind. ROS: as per HPI    Current Outpatient Prescriptions   Medication Sig    aspirin delayed-release 81 mg tablet Take  by mouth daily.  losartan (COZAAR) 25 mg tablet Take 1 Tab by mouth daily for 60 days.  hydroCHLOROthiazide (HYDRODIURIL) 25 mg tablet Take 1 Tab by mouth daily for 180 days.  OTHER Taking for cramps and lighter menses, 1x per month  Indications: [place] 1 tab monthly    levonorgestrel (MIRENA) 20 mcg/24 hr (5 years) IUD 1 Applicator. Starting 7/2015, 5 years     No current facility-administered medications for this visit. Health Maintenance   Topic Date Due    Influenza Age 5 to Adult  08/01/2018    PAP AKA CERVICAL CYTOLOGY  01/01/2021    DTaP/Tdap/Td series (2 - Td) 03/05/2028       There is no immunization history on file for this patient. Allergies and Medications: Reviewed and updated in EMR. Patient Active Problem List   Diagnosis Code    Obesity (BMI 30.0-34. 9) E66.9    Hypertension I10    Anxiety F41.9    Fatigue R53.83    Palpitations R00.2     Family History, Social History, Past Medical History, Surgical History: Reviewed and updated in EMR as appropriate. OBJECTIVE:   Visit Vitals    /82    Pulse 74    Temp 97.9 °F (36.6 °C) (Oral)    Resp 16    Ht 5' 5\" (1.651 m)    Wt 202 lb (91.6 kg)    LMP 06/10/2018    SpO2 98%    BMI 33.61 kg/m2        BP Readings from Last 3 Encounters:   06/18/18 139/82   04/16/18 (!) 158/92   03/05/18 150/79     Wt Readings from Last 3 Encounters:   06/18/18 202 lb (91.6 kg)   04/16/18 206 lb (93.4 kg)   03/05/18 202 lb (91.6 kg)       General: Pleasant adult woman in no acute distress  HEENT: Head is atraumatic normo-cephalic. CVS: Heart regular, rate, and rhythm. Audible S1 and S2. No murmurs, rubs or gallops. PULM: Lungs clear to auscultation bilaterally. No wheezes, rales or rhonchi. EXT: 2+ radial pulses bilaterally. No pedal edema bilaterally  Neuro: Alert and oriented x3. MSE: mood euthymic, affect congruent and reactive. Nursing Notes Reviewed. LABS/RADIOLOGICAL TESTS:  Lab Results   Component Value Date/Time    Sodium 139 05/09/2018 09:07 AM    Potassium 4.3 05/09/2018 09:07 AM    Chloride 104 05/09/2018 09:07 AM    CO2 27 05/09/2018 09:07 AM    Glucose 96 05/09/2018 09:07 AM    BUN 9 05/09/2018 09:07 AM    Creatinine 0.93 05/09/2018 09:07 AM       All lab results and radiological studies were reviewed and discussed with the patient. ASSESSMENT/PLAN:      ICD-10-CM ICD-9-CM    1. Essential hypertension I10 401.9 losartan (COZAAR) 25 mg tablet      METABOLIC PANEL, BASIC  D/c lisinopril  Continue HCTZ 25 mg daily  DASH diet  Exercise encouraged   2. Obesity (BMI 30.0-34. 9) E66.9 278.00 REFERRAL TO NUTRITION       Requested Prescriptions     Signed Prescriptions Disp Refills    losartan (COZAAR) 25 mg tablet 30 Tab 1     Sig: Take 1 Tab by mouth daily for 60 days. Patient verbalized understanding and agreement with the plan. Patient was given an after-visit summary.     Follow-up Disposition:  Return in about 6 weeks (around 7/30/2018), or if symptoms worsen or fail to improve, for HTN. or sooner if worsening symptoms. More than 50% of this 25 min visit was spent counseling the patient face to face about etiology and treatment of health conditions outlined in assessment and plan. Levy Bautista M.D.   [place] [phone]  [place] [phone]

## 2018-06-18 NOTE — PROGRESS NOTES
Patient is here for follow up for htn. 1. Have you been to the ER, urgent care clinic since your last visit? Hospitalized since your last visit?no    2. Have you seen or consulted any other health care providers outside of the [address] [place] [address] since your last visit? Include any pap smears or colon screening.  no

## 2018-06-18 NOTE — MR AVS SNAPSHOT
[address] [address] 
[phone] Patient: Isabelle Hernandez MRN: [id number] :1970 Visit Information Date & Time Provider Department Dept. Phone Encounter #  
 2018  9:00 AM Huber Greenberg MD [address] [facility] [phone] [id number] Follow-up Instructions Routing History Upcoming Health Maintenance Date Due Influenza Age 5 to Adult 2018 PAP AKA CERVICAL CYTOLOGY 2021 DTaP/Tdap/Td series (2 - Td) 3/5/2028 Allergies as of 2018  Review Complete On: 2018 By: Huber Greenberg MD  
  
 Severity Noted Reaction Type Reactions Codeine  2018    Nausea and Vomiting Lisinopril  2018    Cough Current Immunizations  Never Reviewed No immunizations on file. Not reviewed this visit You Were Diagnosed With   
  
 Codes Comments Essential hypertension    -  Primary ICD-10-CM: I10 
ICD-9-CM: 401.9 Vitals BP Pulse Temp Resp Height(growth percentile) Weight(growth percentile) 139/82 74 97.9 °F (36.6 °C) (Oral) 16 5' 5\" (1.651 m) 202 lb (91.6 kg) LMP SpO2 BMI OB Status Smoking Status 06/10/2018 98% 33.61 kg/m2 Having regular periods Never Smoker Vitals History BMI and BSA Data Body Mass Index Body Surface Area  
 33.61 kg/m 2 2.05 m 2 Preferred Pharmacy Pharmacy Name Phone [id number] [address] [phone] [phone] Your Updated Medication List  
  
   
This list is accurate as of 18  9:35 AM.  Always use your most recent med list.  
  
  
  
  
 aspirin delayed-release 81 mg tablet Take  by mouth daily. hydroCHLOROthiazide 25 mg tablet Commonly known as:  HYDRODIURIL Take 1 Tab by mouth daily for 180 days. losartan 25 mg tablet Commonly known as:  COZAAR Take 1 Tab by mouth daily for 60 days. MIRENA 20 mcg/24 hr (5 years) Iud  
Generic drug:  levonorgestrel 1 Applicator. Starting 7/2015, 5 years OTHER Taking for cramps and lighter menses, 1x per month  Indications: Nicole Ashing 1 tab monthly Prescriptions Sent to Pharmacy Refills  
 losartan (COZAAR) 25 mg tablet 1 Sig: Take 1 Tab by mouth daily for 60 days. Class: Normal  
 Pharmacy: [facility]acy #[id number]- [phone]  #: [phone] Route: Oral  
  
To-Do List   
 06/18/2018 Lab:  METABOLIC PANEL, BASIC Patient Instructions Start taking losartan 25 mg daily with HCTZ 25 mg Call [facility] to get refill of HCTZ Return in 2-3 weeks for blood draw to monitor kidney function, no need to fast or schedule an appointment, just show up 
continue with 2 - 2.5 liters of water per day Max salt 2 grams per 24 hours Check blood pressure mid-day 1-2  x per week and write down Make sure you are seated for at least  5-10 minutes, legs uncrossed and back resting Bring log to clinic next visit DASH Diet: Care Instructions Your Care Instructions The DASH diet is an eating plan that can help lower your blood pressure. DASH stands for Dietary Approaches to Stop Hypertension. Hypertension is high blood pressure. The DASH diet focuses on eating foods that are high in calcium, potassium, and magnesium. These nutrients can lower blood pressure. The foods that are highest in these nutrients are fruits, vegetables, low-fat dairy products, nuts, seeds, and legumes. But taking calcium, potassium, and magnesium supplements instead of eating foods that are high in those nutrients does not have the same effect. The DASH diet also includes whole grains, fish, and poultry. The DASH diet is one of several lifestyle changes your doctor may recommend to lower your high blood pressure. Your doctor may also want you to decrease the amount of sodium in your diet.  Lowering sodium while following the DASH diet can lower blood pressure even further than just the DASH diet alone. Follow-up care is a key part of your treatment and safety. Be sure to make and go to all appointments, and call your doctor if you are having problems. It's also a good idea to know your test results and keep a list of the medicines you take. How can you care for yourself at home? Following the DASH diet · Eat 4 to 5 servings of fruit each day. A serving is 1 medium-sized piece of fruit, ½ cup chopped or canned fruit, 1/4 cup dried fruit, or 4 ounces (½ cup) of fruit juice. Choose fruit more often than fruit juice. · Eat 4 to 5 servings of vegetables each day. A serving is 1 cup of lettuce or raw leafy vegetables, ½ cup of chopped or cooked vegetables, or 4 ounces (½ cup) of vegetable juice. Choose vegetables more often than vegetable juice. · Get 2 to 3 servings of low-fat and fat-free dairy each day. A serving is 8 ounces of milk, 1 cup of yogurt, or 1 ½ ounces of cheese. · Eat 6 to 8 servings of grains each day. A serving is 1 slice of bread, 1 ounce of dry cereal, or ½ cup of cooked rice, pasta, or cooked cereal. Try to choose whole-grain products as much as possible. · Limit lean meat, poultry, and fish to 2 servings each day. A serving is 3 ounces, about the size of a deck of cards. · Eat 4 to 5 servings of nuts, seeds, and legumes (cooked dried beans, lentils, and split peas) each week. A serving is 1/3 cup of nuts, 2 tablespoons of seeds, or ½ cup of cooked beans or peas. · Limit fats and oils to 2 to 3 servings each day. A serving is 1 teaspoon of vegetable oil or 2 tablespoons of salad dressing. · Limit sweets and added sugars to 5 servings or less a week. A serving is 1 tablespoon jelly or jam, ½ cup sorbet, or 1 cup of lemonade. · Eat less than 2,300 milligrams (mg) of sodium a day. If you limit your sodium to 1,500 mg a day, you can lower your blood pressure even more. Tips for success · Start small. Do not try to make dramatic changes to your diet all at once. You might feel that you are missing out on your favorite foods and then be more likely to not follow the plan. Make small changes, and stick with them. Once those changes become habit, add a few more changes. · Try some of the following: ¨ Make it a goal to eat a fruit or vegetable at every meal and at snacks. This will make it easy to get the recommended amount of fruits and vegetables each day. ¨ Try yogurt topped with fruit and nuts for a snack or healthy dessert. ¨ Add lettuce, tomato, cucumber, and onion to sandwiches. ¨ Combine a ready-made pizza crust with low-fat mozzarella cheese and lots of vegetable toppings. Try using tomatoes, squash, spinach, broccoli, carrots, cauliflower, and onions. ¨ Have a variety of cut-up vegetables with a low-fat dip as an appetizer instead of chips and dip. ¨ Sprinkle sunflower seeds or chopped almonds over salads. Or try adding chopped walnuts or almonds to cooked vegetables. ¨ Try some vegetarian meals using beans and peas. Add garbanzo or kidney beans to salads. Make burritos and tacos with mashed aaron beans or black beans. Where can you learn more? Go to http://lily-osiel.info/. Enter S508 in the search box to learn more about \"DASH Diet: Care Instructions. \" Current as of: September 21, 2016 Content Version: 11.4 © 1658-0546 [employer]. Care instructions adapted under license by [employer] (which disclaims liability or warranty for this information). If you have questions about a medical condition or this instruction, always ask your healthcare professional. [place] [address] any warranty or liability for your use of this information. Losartan (By mouth) Losartan (elicia-JHOANA-tan) Treats high blood pressure. Reduces the risk of stroke in patients with high blood pressure and an enlarged heart.  Treats kidney disease in patients with diabetes. This medicine is an angiotensin receptor blocker (ARB). Brand Name(s): Cozaar There may be other brand names for this medicine. When This Medicine Should Not Be Used: This medicine is not right for everyone. Do not use it if you had an allergic reaction to losartan, or if you are pregnant. Do not use this medicine together with aliskiren if you have diabetes. How to Use This Medicine:  
Tablet · Take your medicine as directed. Your dose may need to be changed several times to find what works best for you. · Drink plenty of fluids if you exercise, sweat more than usual, or have diarrhea or vomiting. · Read and follow the patient instructions that come with this medicine. Talk to your doctor or pharmacist if you have any questions. · Missed dose: Take a dose as soon as you remember. If it is almost time for your next dose, wait until then and take a regular dose. Do not take extra medicine to make up for a missed dose. · Store the medicine in a closed container at room temperature, away from heat, moisture, and direct light. Drugs and Foods to Avoid: Ask your doctor or pharmacist before using any other medicine, including over-the-counter medicines, vitamins, and herbal products. · Some medicines can affect how losartan works. Tell your doctor if you are using any of the following: ¨ Lithium ¨ Rifampin ¨ A diuretic (water pill), such as spironolactone, triamterene, or amiloride ¨ NSAID pain or arthritis medicine, such as aspirin, diclofenac, ibuprofen, or naproxen ¨ Another blood pressure medicine, such as aliskiren, benazepril, enalapril, or lisinopril · Ask your doctor before you use any medicine, supplement, or salt substitute that contains potassium. Warnings While Using This Medicine: · It is not safe to take this medicine during pregnancy. It could harm an unborn baby. Tell your doctor right away if you become pregnant. · Tell your doctor if you are breastfeeding, or if you have kidney disease, liver disease, congestive heart failure, or diabetes. Tell your doctor if you have had angioedema that was caused by a blood pressure medicine. · This medicine could lower your blood pressure too much, especially when you first use it or if you are dehydrated. Stand or sit up slowly if you feel lightheaded or dizzy. · Your doctor will do lab tests at regular visits to check on the effects of this medicine. Keep all appointments. · Keep all medicine out of the reach of children. Never share your medicine with anyone. Possible Side Effects While Using This Medicine:  
Call your doctor right away if you notice any of these side effects: · Allergic reaction: Itching or hives, swelling in your face or hands, swelling or tingling in your mouth or throat, chest tightness, trouble breathing · Change in how much or how often you urinate · Confusion, weakness, uneven heartbeat, trouble breathing, numbness or tingling in your hands, feet, or lips · Lightheadedness, dizziness, fainting · Rapid weight gain, swelling in your hands, ankles, or feet If you notice these less serious side effects, talk with your doctor: · Diarrhea · Tiredness If you notice other side effects that you think are caused by this medicine, tell your doctor. Call your doctor for medical advice about side effects. You may report side effects to FDA at 1-463-FDA-1260 © 2017 [facility] Information is for End User's use only and may not be sold, redistributed or otherwise used for commercial purposes. The above information is an  only. It is not intended as medical advice for individual conditions or treatments. Talk to your doctor, nurse or pharmacist before following any medical regimen to see if it is safe and effective for you. 
 
 
  
 
 
  
Introducing [facility]! [employer] introduces [employer] patient portal. Now you can access parts of your medical record, email your doctor's office, and request medication refills online. 1. In your internet browser, go to [email or web address] 2. Click on the First Time User? Click Here link in the Sign In box. You will see the New Member Sign Up page. 3. Enter your Z[employer] Ahead Access Code exactly as it appears below. You will not need to use this code after youve completed the sign-up process. If you do not sign up before the expiration date, you must request a new code. · [employer] Access Code: [email or web address] Expires: 9/16/2018  9:35 AM 
 
4. Enter the last four digits of your Social Security Number (xxxx) and Date of Birth (mm/dd/yyyy) as indicated and click Submit. You will be taken to the next sign-up page. 5. Create a Zinc Ahead ID. This will be your Z[employer]head login ID and cannot be changed, so think of one that is secure and easy to remember. 6. Create a [employer] password. You can change your password at any time. 7. Enter your Password Reset Question and Answer. This can be used at a later time if you forget your password. 8. Enter your e-mail address. You will receive e-mail notification when new information is available in [address] 9. Click Sign Up. You can now view and download portions of your medical record. 10. Click the Download Summary menu link to download a portable copy of your medical information. If you have questions, please visit the Frequently Asked Questions section of the Zinc Ahead website. Remember, [employer] Ahead is NOT to be used for urgent needs. For medical emergencies, dial 911. Now available from your iPhone and Android! Please provide this summary of care documentation to your next provider. Your primary care clinician is listed as Pietro Guerrero. If you have any questions after today's visit, please call [phone].

## 2018-07-25 PROBLEM — Z11.1 SCREENING-PULMONARY TB: Status: ACTIVE | Noted: 2018-07-25

## 2018-07-26 ENCOUNTER — HOSPITAL ENCOUNTER (OUTPATIENT)
Dept: LAB | Age: 48
Discharge: HOME | End: 2018-07-26
Payer: COMMERCIAL

## 2018-07-26 DIAGNOSIS — I10 ESSENTIAL HYPERTENSION: ICD-10-CM

## 2018-07-26 LAB
ANION GAP SERPL CALC-SCNC: 8 MMOL/L (ref 3–18)
BUN SERPL-MCNC: 9 MG/DL (ref 7–18)
BUN/CREAT SERPL: 10 (ref 12–20)
CALCIUM SERPL-MCNC: 9.3 MG/DL (ref 8.5–10.1)
CHLORIDE SERPL-SCNC: 103 MMOL/L (ref 100–108)
CO2 SERPL-SCNC: 26 MMOL/L (ref 21–32)
CREAT SERPL-MCNC: 0.89 MG/DL (ref 0.6–1.3)
GLUCOSE SERPL-MCNC: 103 MG/DL (ref 74–99)
POTASSIUM SERPL-SCNC: 4 MMOL/L (ref 3.5–5.5)
SODIUM SERPL-SCNC: 137 MMOL/L (ref 136–145)

## 2018-07-26 PROCEDURE — 80048 BASIC METABOLIC PNL TOTAL CA: CPT | Performed by: INTERNAL MEDICINE

## 2018-08-02 ENCOUNTER — OFFICE VISIT (OUTPATIENT)
Dept: FAMILY MEDICINE CLINIC | Age: 48
End: 2018-08-02

## 2018-08-02 VITALS
BODY MASS INDEX: 34.32 KG/M2 | DIASTOLIC BLOOD PRESSURE: 90 MMHG | RESPIRATION RATE: 16 BRPM | HEART RATE: 77 BPM | OXYGEN SATURATION: 98 % | HEIGHT: 65 IN | WEIGHT: 206 LBS | SYSTOLIC BLOOD PRESSURE: 160 MMHG | TEMPERATURE: 98.5 F

## 2018-08-02 DIAGNOSIS — I10 ESSENTIAL HYPERTENSION: Primary | ICD-10-CM

## 2018-08-02 RX ORDER — HYDROCHLOROTHIAZIDE 25 MG/1
25 TABLET ORAL DAILY
Qty: 90 TAB | Refills: 1 | Status: SHIPPED | OUTPATIENT
Start: 2018-08-02 | End: 2018-12-20

## 2018-08-02 RX ORDER — LOSARTAN POTASSIUM 25 MG/1
25 TABLET ORAL DAILY
Qty: 90 TAB | Refills: 1 | Status: SHIPPED | OUTPATIENT
Start: 2018-08-02 | End: 2018-11-02

## 2018-08-02 NOTE — PATIENT INSTRUCTIONS
For BLOOD PRESSURE:  Okay to take losartan and HCTZ at same time every day  Check blood pressure mid-day 1-2 x per week and write down  Make sure you are seated for at least  5-10 minutes, legs uncrossed and back resting  Bring log to clinic next visit  Max salt per day 2 grams (2000 mg)     DASH Diet: Care Instructions  Your Care Instructions    The DASH diet is an eating plan that can help lower your blood pressure. DASH stands for Dietary Approaches to Stop Hypertension. Hypertension is high blood pressure. The DASH diet focuses on eating foods that are high in calcium, potassium, and magnesium. These nutrients can lower blood pressure. The foods that are highest in these nutrients are fruits, vegetables, low-fat dairy products, nuts, seeds, and legumes. But taking calcium, potassium, and magnesium supplements instead of eating foods that are high in those nutrients does not have the same effect. The DASH diet also includes whole grains, fish, and poultry. The DASH diet is one of several lifestyle changes your doctor may recommend to lower your high blood pressure. Your doctor may also want you to decrease the amount of sodium in your diet. Lowering sodium while following the DASH diet can lower blood pressure even further than just the DASH diet alone. Follow-up care is a key part of your treatment and safety. Be sure to make and go to all appointments, and call your doctor if you are having problems. It's also a good idea to know your test results and keep a list of the medicines you take. How can you care for yourself at home? Following the DASH diet  · Eat 4 to 5 servings of fruit each day. A serving is 1 medium-sized piece of fruit, ½ cup chopped or canned fruit, 1/4 cup dried fruit, or 4 ounces (½ cup) of fruit juice. Choose fruit more often than fruit juice. · Eat 4 to 5 servings of vegetables each day.  A serving is 1 cup of lettuce or raw leafy vegetables, ½ cup of chopped or cooked vegetables, or 4 ounces (½ cup) of vegetable juice. Choose vegetables more often than vegetable juice. · Get 2 to 3 servings of low-fat and fat-free dairy each day. A serving is 8 ounces of milk, 1 cup of yogurt, or 1 ½ ounces of cheese. · Eat 6 to 8 servings of grains each day. A serving is 1 slice of bread, 1 ounce of dry cereal, or ½ cup of cooked rice, pasta, or cooked cereal. Try to choose whole-grain products as much as possible. · Limit lean meat, poultry, and fish to 2 servings each day. A serving is 3 ounces, about the size of a deck of cards. · Eat 4 to 5 servings of nuts, seeds, and legumes (cooked dried beans, lentils, and split peas) each week. A serving is 1/3 cup of nuts, 2 tablespoons of seeds, or ½ cup of cooked beans or peas. · Limit fats and oils to 2 to 3 servings each day. A serving is 1 teaspoon of vegetable oil or 2 tablespoons of salad dressing. · Limit sweets and added sugars to 5 servings or less a week. A serving is 1 tablespoon jelly or jam, ½ cup sorbet, or 1 cup of lemonade. · Eat less than 2,300 milligrams (mg) of sodium a day. If you limit your sodium to 1,500 mg a day, you can lower your blood pressure even more. Tips for success  · Start small. Do not try to make dramatic changes to your diet all at once. You might feel that you are missing out on your favorite foods and then be more likely to not follow the plan. Make small changes, and stick with them. Once those changes become habit, add a few more changes. · Try some of the following:  ¨ Make it a goal to eat a fruit or vegetable at every meal and at snacks. This will make it easy to get the recommended amount of fruits and vegetables each day. ¨ Try yogurt topped with fruit and nuts for a snack or healthy dessert. ¨ Add lettuce, tomato, cucumber, and onion to sandwiches. ¨ Combine a ready-made pizza crust with low-fat mozzarella cheese and lots of vegetable toppings.  Try using tomatoes, squash, spinach, broccoli, carrots, cauliflower, and onions. ¨ Have a variety of cut-up vegetables with a low-fat dip as an appetizer instead of chips and dip. ¨ Sprinkle sunflower seeds or chopped almonds over salads. Or try adding chopped walnuts or almonds to cooked vegetables. ¨ Try some vegetarian meals using beans and peas. Add garbanzo or kidney beans to salads. Make burritos and tacos with mashed aaron beans or black beans. Where can you learn more? Go to http://lily-osiel.info/. Enter U409 in the search box to learn more about \"DASH Diet: Care Instructions. \"  Current as of: December 6, 2017  Content Version: 11.7  © 0786-3701 [employer]. Care instructions adapted under license by [employer] (which disclaims liability or warranty for this information). If you have questions about a medical condition or this instruction, always ask your healthcare professional. [address] any warranty or liability for your use of this information.

## 2018-08-02 NOTE — PROGRESS NOTES
Internal Medicine Follow Up Visit Note    Chief Complaint   Patient presents with    Hypertension       HPI:  Karli Gupta is a 57 y.o.  female with history significant for HTN, obesity is here for the above complaint(s). Hypertension: Last seen 6/18/2018 added losartan 25 mg to HCTZ 25 mg daily. Today BP is uncontrolled. Taking medicines as prescribed, last dose this AM . No side effects from medications. Medication good compliance. Denies headache, lightheadedness, dizziness, vision changes, chest pain, rapid/irregular heart rate, shortness of breath, cough, abdominal pain, leg swelling, leg pain. Does checks BP outside of office with readings averaging 130s/70s-80s. Pizza for dinner last night for dinner, sausage, pepporoni. ROS: as per HPI      Current Outpatient Prescriptions   Medication Sig    losartan (COZAAR) 25 mg tablet Take 1 Tab by mouth daily for 180 days.  hydroCHLOROthiazide (HYDRODIURIL) 25 mg tablet Take 1 Tab by mouth daily for 180 days.  aspirin delayed-release 81 mg tablet Take  by mouth daily.  levonorgestrel (MIRENA) 20 mcg/24 hr (5 years) IUD 1 Applicator. Starting 7/2015, 5 years    OTHER Taking for cramps and lighter menses, 1x per month  Indications: Taty Railing 1 tab monthly     No current facility-administered medications for this visit. Health Maintenance   Topic Date Due    Influenza Age 5 to Adult  08/01/2018    PAP AKA CERVICAL CYTOLOGY  01/01/2021    DTaP/Tdap/Td series (3 - Td) 03/05/2028     Immunization History   Administered Date(s) Administered    Tdap 01/01/2002       Allergies and Medications: Reviewed and updated in EMR. Past Medical History:   Diagnosis Date    Hypertension 2009    Obesity (BMI 30.0-34.9) 2008    Vitamin D deficiency        Family History, Social History, Past Medical History, Surgical History: Reviewed and updated in EMR as appropriate.       OBJECTIVE:   Visit Vitals    /90    Pulse 77    Temp 98.5 °F (36.9 °C) (Oral)    Resp 16    Ht 5' 5\" (1.651 m)    Wt 206 lb (93.4 kg)    LMP 07/07/2018 (Exact Date)    SpO2 98%    BMI 34.28 kg/m2        BP Readings from Last 3 Encounters:   08/02/18 160/90   06/18/18 139/82   04/16/18 (!) 158/92     Wt Readings from Last 3 Encounters:   08/02/18 206 lb (93.4 kg)   06/18/18 202 lb (91.6 kg)   04/16/18 206 lb (93.4 kg)       General: Pleasant adult woman in no acute distress  HEENT: Head is atraumatic normo-cephalic. CVS:Heart regular, rate, and rhythm. Audible S1 and S2. No murmurs, rubs or gallops   PULM: Lungs clear to auscultation bilaterally. EXT: 2+ dorsalis pedis pulses bilaterally. No pedal edema bilaterally      Nursing Notes Reviewed. LABS/RADIOLOGICAL TESTS:      Lab Results   Component Value Date/Time    Sodium 137 07/26/2018 11:28 AM    Potassium 4.0 07/26/2018 11:28 AM    Chloride 103 07/26/2018 11:28 AM    CO2 26 07/26/2018 11:28 AM    Glucose 103 (H) 07/26/2018 11:28 AM    BUN 9 07/26/2018 11:28 AM    Creatinine 0.89 07/26/2018 11:28 AM     All lab results and radiological studies were reviewed and discussed with the patient. ASSESSMENT/PLAN:      ICD-10-CM ICD-9-CM    1. Essential hypertension I10 401.9 losartan (COZAAR) 25 mg tablet      hydroCHLOROthiazide (HYDRODIURIL) 25 mg tablet  DASH DIET  Exercise  RTC in 3 months  BP log        Requested Prescriptions     Signed Prescriptions Disp Refills    losartan (COZAAR) 25 mg tablet 90 Tab 1     Sig: Take 1 Tab by mouth daily for 180 days.  hydroCHLOROthiazide (HYDRODIURIL) 25 mg tablet 90 Tab 1     Sig: Take 1 Tab by mouth daily for 180 days. Patient verbalized understanding and agreement with the plan. Patient was given an after-visit summary. Follow-up Disposition:  Return in about 3 months (around 11/2/2018), or if symptoms worsen or fail to improve, for HTN. or sooner if worsening symptoms.     More than 50% of this 25 min visit was spent counseling the patient face to face about etiology and treatment of health conditions outlined in assessment and plan. Niesha Hicks M.D.   [address], [address] [place] [address], [address] [place] [address] [phone]  ZV -

## 2018-08-02 NOTE — MR AVS SNAPSHOT
[address] [address] [place] [address] 
[phone] Patient: Dante Manzanares MRN: [id number] :[id number] Visit Information Date & Time Provider Department Dept. Phone Encounter #  
 2018  8:30 AM Miriam Betancourt MD [address] [facility] [address] [phone] [id number] Follow-up Instructions Return in about 3 months (around 2018), or if symptoms worsen or fail to improve, for HTN. Upcoming Health Maintenance Date Due Influenza Age 5 to Adult 2018 PAP AKA CERVICAL CYTOLOGY 2021 DTaP/Tdap/Td series (2 - Td) 3/5/2028 Allergies as of 2018  Review Complete On: 2018 By: Hattie Elizabeth LPN Severity Noted Reaction Type Reactions Codeine  2018    Nausea and Vomiting Lisinopril  2018    Cough Current Immunizations  Never Reviewed Name Date Tdap 2002 12:00 AM  
  
 Not reviewed this visit You Were Diagnosed With   
  
 Codes Comments Essential hypertension    -  Primary ICD-10-CM: I10 
ICD-9-CM: 401.9 Vitals BP Pulse Temp Resp Height(growth percentile) Weight(growth percentile) 160/90 77 98.5 °F (36.9 °C) (Oral) 16 5' 5\" (1.651 m) 206 lb (93.4 kg) LMP SpO2 BMI OB Status Smoking Status 2018 (Exact Date) 98% 34.28 kg/m2 Having regular periods Never Smoker Vitals History BMI and BSA Data Body Mass Index Body Surface Area  
 34.28 kg/m 2 2.07 m 2 Preferred Pharmacy Pharmacy Name Phone [phone] No [facility] [phone] Your Updated Medication List  
  
   
This list is accurate as of 18  9:32 AM.  Always use your most recent med list.  
  
  
  
  
 aspirin delayed-release 81 mg tablet Take  by mouth daily. hydroCHLOROthiazide 25 mg tablet Commonly known as:  HYDRODIURIL Take 1 Tab by mouth daily for 180 days. losartan 25 mg tablet Commonly known as:  COZAAR Take 1 Tab by mouth daily for 180 days. MIRENA 20 mcg/24 hr (5 years) Iud  
Generic drug:  levonorgestrel 1 Applicator. Starting 7/2015, 5 years OTHER Taking for cramps and lighter menses, 1x per month  Indications: [place] 1 tab monthly Prescriptions Sent to Pharmacy Refills  
 losartan (COZAAR) 25 mg tablet 1 Sig: Take 1 Tab by mouth daily for 180 days. Class: Normal  
 Pharmacy: [id number] [phone] No [facility]: [phone] Route: Oral  
 hydroCHLOROthiazide (HYDRODIURIL) 25 mg tablet 1 Sig: Take 1 Tab by mouth daily for 180 days. Class: Normal  
 Pharmacy: [facility]acy #[id number]- [facility] 142 226 No [facility]  Ph #: [phone] Route: Oral  
  
Follow-up Instructions Return in about 3 months (around 11/2/2018), or if symptoms worsen or fail to improve, for HTN. Patient Instructions For BLOOD PRESSURE: 
Okay to take losartan and HCTZ at same time every day Check blood pressure mid-day 1-2 x per week and write down Make sure you are seated for at least  5-10 minutes, legs uncrossed and back resting Bring log to clinic next visit Max salt per day 2 grams (2000 mg) DASH Diet: Care Instructions Your Care Instructions The DASH diet is an eating plan that can help lower your blood pressure. DASH stands for Dietary Approaches to Stop Hypertension. Hypertension is high blood pressure. The DASH diet focuses on eating foods that are high in calcium, potassium, and magnesium. These nutrients can lower blood pressure. The foods that are highest in these nutrients are fruits, vegetables, low-fat dairy products, nuts, seeds, and legumes. But taking calcium, potassium, and magnesium supplements instead of eating foods that are high in those nutrients does not have the same effect. The DASH diet also includes whole grains, fish, and poultry. The DASH diet is one of several lifestyle changes your doctor may recommend to lower your high blood pressure. Your doctor may also want you to decrease the amount of sodium in your diet. Lowering sodium while following the DASH diet can lower blood pressure even further than just the DASH diet alone. Follow-up care is a key part of your treatment and safety. Be sure to make and go to all appointments, and call your doctor if you are having problems. It's also a good idea to know your test results and keep a list of the medicines you take. How can you care for yourself at home? Following the DASH diet · Eat 4 to 5 servings of fruit each day. A serving is 1 medium-sized piece of fruit, ½ cup chopped or canned fruit, 1/4 cup dried fruit, or 4 ounces (½ cup) of fruit juice. Choose fruit more often than fruit juice. · Eat 4 to 5 servings of vegetables each day. A serving is 1 cup of lettuce or raw leafy vegetables, ½ cup of chopped or cooked vegetables, or 4 ounces (½ cup) of vegetable juice. Choose vegetables more often than vegetable juice. · Get 2 to 3 servings of low-fat and fat-free dairy each day. A serving is 8 ounces of milk, 1 cup of yogurt, or 1 ½ ounces of cheese. · Eat 6 to 8 servings of grains each day. A serving is 1 slice of bread, 1 ounce of dry cereal, or ½ cup of cooked rice, pasta, or cooked cereal. Try to choose whole-grain products as much as possible. · Limit lean meat, poultry, and fish to 2 servings each day. A serving is 3 ounces, about the size of a deck of cards. · Eat 4 to 5 servings of nuts, seeds, and legumes (cooked dried beans, lentils, and split peas) each week. A serving is 1/3 cup of nuts, 2 tablespoons of seeds, or ½ cup of cooked beans or peas. · Limit fats and oils to 2 to 3 servings each day. A serving is 1 teaspoon of vegetable oil or 2 tablespoons of salad dressing. · Limit sweets and added sugars to 5 servings or less a week.  A serving is 1 tablespoon jelly or jam, ½ cup sorbet, or 1 cup of lemonade. · Eat less than 2,300 milligrams (mg) of sodium a day. If you limit your sodium to 1,500 mg a day, you can lower your blood pressure even more. Tips for success · Start small. Do not try to make dramatic changes to your diet all at once. You might feel that you are missing out on your favorite foods and then be more likely to not follow the plan. Make small changes, and stick with them. Once those changes become habit, add a few more changes. · Try some of the following: ¨ Make it a goal to eat a fruit or vegetable at every meal and at snacks. This will make it easy to get the recommended amount of fruits and vegetables each day. ¨ Try yogurt topped with fruit and nuts for a snack or healthy dessert. ¨ Add lettuce, tomato, cucumber, and onion to sandwiches. ¨ Combine a ready-made pizza crust with low-fat mozzarella cheese and lots of vegetable toppings. Try using tomatoes, squash, spinach, broccoli, carrots, cauliflower, and onions. ¨ Have a variety of cut-up vegetables with a low-fat dip as an appetizer instead of chips and dip. ¨ Sprinkle sunflower seeds or chopped almonds over salads. Or try adding chopped walnuts or almonds to cooked vegetables. ¨ Try some vegetarian meals using beans and peas. Add garbanzo or kidney beans to salads. Make burritos and tacos with mashed aaron beans or black beans. Where can you learn more? Go to http://lily-osiel.info/. Enter C317 in the search box to learn more about \"DASH Diet: Care Instructions. \" Current as of: December 6, 2017 Content Version: 11.7 © 2530-0792 [employer]. Care instructions adapted under license by [employer] (which disclaims liability or warranty for this information).  If you have questions about a medical condition or this instruction, always ask your healthcare professional. [employer] disclaims any warranty or liability for your use of this information. Introducing \[facility]\"" & HEALTH SERVICES! Sara Doe introduces Finsphere patient portal. Now you can access parts of your medical record, email your doctor's office, and request medication refills online. 1. In your internet browser, go to [email or web address] 2. Click on the First Time User? Click Here link in the Sign In box. You will see the New Member Sign Up page. 3. Enter your Finsphere Access Code exactly as it appears below. You will not need to use this code after youve completed the sign-up process. If you do not sign up before the expiration date, you must request a new code. · Finsphere Access Code: [email or web address] Expires: 9/16/2018  9:35 AM 
 
4. Enter the last four digits of your Social Security Number (xxxx) and Date of Birth (mm/dd/yyyy) as indicated and click Submit. You will be taken to the next sign-up page. 5. Create a Finsphere ID. This will be your Finsphere login ID and cannot be changed, so think of one that is secure and easy to remember. 6. Create a Finsphere password. You can change your password at any time. 7. Enter your Password Reset Question and Answer. This can be used at a later time if you forget your password. 8. Enter your e-mail address. You will receive e-mail notification when new information is available in [address] 9. Click Sign Up. You can now view and download portions of your medical record. 10. Click the Download Summary menu link to download a portable copy of your medical information. If you have questions, please visit the Frequently Asked Questions section of the Finsphere website. Remember, Finsphere is NOT to be used for urgent needs. For medical emergencies, dial 911. Now available from your iPhone and Android! Please provide this summary of care documentation to your next provider. Your primary care clinician is listed as Kellen Toribio. If you have any questions after today's visit, please call [phone].

## 2018-08-02 NOTE — PROGRESS NOTES
Chief Complaint   Patient presents with    Hypertension     1. Have you been to the ER, urgent care clinic since your last visit? Hospitalized since your last visit? No    2. Have you seen or consulted any other health care providers outside of the [address] [place] [address] since your last visit? Include any pap smears or colon screening.  No

## 2018-11-02 ENCOUNTER — OFFICE VISIT (OUTPATIENT)
Dept: FAMILY MEDICINE CLINIC | Age: 48
End: 2018-11-02

## 2018-11-02 VITALS
OXYGEN SATURATION: 98 % | TEMPERATURE: 98.3 F | HEART RATE: 79 BPM | WEIGHT: 210 LBS | RESPIRATION RATE: 18 BRPM | DIASTOLIC BLOOD PRESSURE: 94 MMHG | HEIGHT: 65 IN | SYSTOLIC BLOOD PRESSURE: 168 MMHG | BODY MASS INDEX: 34.99 KG/M2

## 2018-11-02 DIAGNOSIS — Z12.39 BREAST CANCER SCREENING: ICD-10-CM

## 2018-11-02 DIAGNOSIS — I10 ESSENTIAL HYPERTENSION: Primary | ICD-10-CM

## 2018-11-02 DIAGNOSIS — E66.9 OBESITY (BMI 30.0-34.9): ICD-10-CM

## 2018-11-02 RX ORDER — LOSARTAN POTASSIUM 50 MG/1
50 TABLET ORAL DAILY
Qty: 30 TAB | Refills: 1 | Status: SHIPPED | OUTPATIENT
Start: 2018-11-02 | End: 2018-12-20

## 2018-11-02 RX ORDER — CHOLECALCIFEROL TAB 125 MCG (5000 UNIT) 125 MCG
TAB ORAL DAILY
COMMUNITY

## 2018-11-02 NOTE — PROGRESS NOTES
Internal Medicine Follow Up Visit Note Chief Complaint Patient presents with  Hypertension  
  follow up HPI:  Kris Burns is a 57 y.o.  female with history significant for HTN, obesity  
is here for the above complaint(s). Last seen 8/2/2018 for HTN. Hypertension: Today BP is uncontrolled. Taking medicines as prescribed, last dose this AM . No side effects from medications. Medication good compliance. Denies headache, lightheadedness, dizziness, vision changes, chest pain, rapid/irregular heart rate, shortness of breath, cough, abdominal pain, leg swelling, leg pain. Does checks BP outside of office with readings averaging 140s/90s. Not watching salt in diet. Current Outpatient Medications Medication Sig  cholecalciferol, VITAMIN D3, (VITAMIN D3) 5,000 unit tab tablet Take  by mouth daily.  ferrous sulfate (IRON PO) Take  by mouth.  losartan (COZAAR) 50 mg tablet Take 1 Tab by mouth daily.  hydroCHLOROthiazide (HYDRODIURIL) 25 mg tablet Take 1 Tab by mouth daily for 180 days.  aspirin delayed-release 81 mg tablet Take  by mouth daily.  OTHER Taking for cramps and lighter menses, 1x per month  Indications: [place] 1 tab monthly  levonorgestrel (MIRENA) 20 mcg/24 hr (5 years) IUD 1 Applicator. Starting 7/2015, 5 years No current facility-administered medications for this visit. Health Maintenance Topic Date Due  Influenza Age 5 to Adult  04/16/2019 (Originally 8/1/2018)  PAP AKA CERVICAL CYTOLOGY  01/01/2021  
 DTaP/Tdap/Td series (3 - Td) 03/05/2028 Immunization History Administered Date(s) Administered  Tdap 01/01/2002 Allergies and Medications: Reviewed and updated in EMR. Patient Active Problem List  
Diagnosis Code  Obesity (BMI 30.0-34. 9) E66.9  Hypertension I10  
 Anxiety F41.9  Fatigue R53.83  
 Palpitations R00.2  Screening-pulmonary TB Z11.1 Family History, Social History, Past Medical History, Surgical History: Reviewed and updated in EMR as appropriate. OBJECTIVE:  
Visit Vitals BP (!) 168/94 Pulse 79 Temp 98.3 °F (36.8 °C) (Oral) Resp 18 Ht 5' 5\" (1.651 m) Wt 210 lb (95.3 kg) LMP 10/23/2018 (Approximate) SpO2 98% BMI 34.95 kg/m² BP Readings from Last 3 Encounters:  
11/02/18 (!) 168/94  
08/02/18 160/90  
06/18/18 139/82 Wt Readings from Last 3 Encounters:  
11/02/18 210 lb (95.3 kg) 08/02/18 206 lb (93.4 kg) 06/18/18 202 lb (91.6 kg) General: Pleasant adult woman in no acute distress HEENT: Head is atraumatic normo-cephalic. Neuro: Alert and oriented x3. Gait wnl. MSE: mood euthymic, affect congruent and reactive. No SI/HI. Nursing Notes Reviewed. LABS/RADIOLOGICAL TESTS: 
 
Lab Results Component Value Date/Time WBC 6.3 02/09/2018 08:45 AM  
 HGB 12.9 02/09/2018 08:45 AM  
 HCT 40.2 02/09/2018 08:45 AM  
 PLATELET 936 75/66/1461 08:45 AM  
 
Lab Results Component Value Date/Time Sodium 137 07/26/2018 11:28 AM  
 Potassium 4.0 07/26/2018 11:28 AM  
 Chloride 103 07/26/2018 11:28 AM  
 CO2 26 07/26/2018 11:28 AM  
 Glucose 103 (H) 07/26/2018 11:28 AM  
 BUN 9 07/26/2018 11:28 AM  
 Creatinine 0.89 07/26/2018 11:28 AM  
 
Lab Results Component Value Date/Time Cholesterol, total 165 02/09/2018 08:45 AM  
 HDL Cholesterol 52 02/09/2018 08:45 AM  
 LDL, calculated 91.6 02/09/2018 08:45 AM  
 Triglyceride 107 02/09/2018 08:45 AM  
 
No results found for: GPT All lab results and radiological studies were reviewed and discussed with the patient. ASSESSMENT/PLAN:   
  ICD-10-CM ICD-9-CM 1. Essential hypertension I10 401.9 losartan (COZAAR) 50 mg tablet Continue HCTZ 25 mg daily BP log Low salt diet Exercise, weight loss Given urgent return precuations METABOLIC PANEL, BASIC  
2. Obesity (BMI 30.0-34. 9) E66.9 278.00 Diet and exercise plan discussed BMI education provided 3. Breast cancer screening Z12.31 V76.10 [facility] MAMMO BI SCREENING INCL CAD Requested Prescriptions Signed Prescriptions Disp Refills  losartan (COZAAR) 50 mg tablet 30 Tab 1 Sig: Take 1 Tab by mouth daily. Patient verbalized understanding and agreement with the plan. Patient was given an after-visit summary. Follow-up Disposition: 
Return in about 4 weeks (around 11/30/2018) for HTN. or sooner if worsening symptoms. More than 50% of this 25 min visit was spent counseling the patient face to face about etiology and treatment of health conditions outlined in assessment and plan. Rio Huerta M.D. [address], [address] [place], [address] [place] [address] [phone] Fax - [phone] or [phone]

## 2018-11-02 NOTE — PATIENT INSTRUCTIONS
FOR BLOOD PRESSURE: 
Start taking losartan 50 mg daily and continue HCTZ 25 mg in morning Get blood work 1-2 weeks after start of medicine-schedule with  for Friday, no need to fast 
Continue goal 2 liters of water per day Check blood pressure mid-day 2-3  x per week and write down Make sure you are seated for at least  5-10 minutes, legs uncrossed and back resting Bring log to clinic next visit Max salt per day 2 grams (2000 mg) We are sending a referral to [facility] for MAMMOGRAM breast cancer screening . You should be called about this in 1-2 weeks. If no word in 2 weeks please give us a call to follow up. FOR WEIGHT: 
Continue to work on low sugar/carbohydrate diet for weight loss Exercise 30 minutes a day 4-5 days week Learning About Low-Carbohydrate Diets for Weight Loss What is a low-carbohydrate diet? Low-carb diets avoid foods that are high in carbohydrate. These high-carb foods include pasta, bread, rice, cereal, fruits, and starchy vegetables. Instead, these diets usually have you eat foods that are high in fat and protein. Many people lose weight quickly on a low-carb diet. But the early weight loss is water. People on this diet often gain the weight back after they start eating carbs again. Not all diet plans are safe or work well. A lot of the evidence shows that low-carb diets aren't healthy. That's because these diets often don't include healthy foods like fruits and vegetables. Losing weight safely means balancing protein, fat, and carbs with every meal and snack. And low-carb diets don't always provide the vitamins, minerals, and fiber you need. If you have a serious medical condition, talk to your doctor before you try any diet. These conditions include kidney disease, heart disease, type 2 diabetes, high cholesterol, and high blood pressure. If you are pregnant, it may not be safe for your baby if you are on a low-carb diet. How can you lose weight safely? You might have heard that a diet plan helped another person lose weight. But that doesn't mean that it will work for you. It is very hard to stay on a diet that includes lots of big changes in your eating habits. If you want to get to a healthy weight and stay there, making healthy lifestyle changes will often work better than dieting. These steps can help. · Make a plan for change. Work with your doctor to create a plan that is right for you. · See a dietitian. He or she can show you how to make healthy changes in your eating habits. · Manage stress. If you have a lot of stress in your life, it can be hard to focus on making healthy changes to your daily habits. · Track your food and activity. You are likely to do better at losing weight if you keep track of what you eat and what you do. Follow-up care is a key part of your treatment and safety. Be sure to make and go to all appointments, and call your doctor if you are having problems. It's also a good idea to know your test results and keep a list of the medicines you take. Where can you learn more? Go to http://lily-osiel.info/. Enter A121 in the search box to learn more about \"Learning About Low-Carbohydrate Diets for Weight Loss. \" Current as of: December 8, 2016 Content Version: 11.3 © 6167-5368 [employer]. Care instructions adapted under license by [employer] (which disclaims liability or warranty for this information). If you have questions about a medical condition or this instruction, always ask your healthcare professional. [place] [address] any warranty or liability for your use of this information. Body Mass Index: Care Instructions Your Care Instructions Body mass index (BMI) can help you see if your weight is raising your risk for health problems. It uses a formula to compare how much you weigh with how tall you are. · A BMI lower than 18.5 is considered underweight. · A BMI between 18.5 and 24.9 is considered healthy. · A BMI between 25 and 29.9 is considered overweight. A BMI of 30 or higher is considered obese. If your BMI is in the normal range, it means that you have a lower risk for weight-related health problems. If your BMI is in the overweight or obese range, you may be at increased risk for weight-related health problems, such as high blood pressure, heart disease, stroke, arthritis or joint pain, and diabetes. If your BMI is in the underweight range, you may be at increased risk for health problems such as fatigue, lower protection (immunity) against illness, muscle loss, bone loss, hair loss, and hormone problems. BMI is just one measure of your risk for weight-related health problems. You may be at higher risk for health problems if you are not active, you eat an unhealthy diet, or you drink too much alcohol or use tobacco products. Follow-up care is a key part of your treatment and safety. Be sure to make and go to all appointments, and call your doctor if you are having problems. It's also a good idea to know your test results and keep a list of the medicines you take. How can you care for yourself at home? · Practice healthy eating habits. This includes eating plenty of fruits, vegetables, whole grains, lean protein, and low-fat dairy. · If your doctor recommends it, get more exercise. Walking is a good choice. Bit by bit, increase the amount you walk every day. Try for at least 30 minutes on most days of the week. · Do not smoke. Smoking can increase your risk for health problems. If you need help quitting, talk to your doctor about stop-smoking programs and medicines. These can increase your chances of quitting for good. · Limit alcohol to 2 drinks a day for men and 1 drink a day for women. Too much alcohol can cause health problems. If you have a BMI higher than 25 · Your doctor may do other tests to check your risk for weight-related health problems. This may include measuring the distance around your waist. A waist measurement of more than 40 inches in men or 35 inches in women can increase the risk of weight-related health problems. · Talk with your doctor about steps you can take to stay healthy or improve your health. You may need to make lifestyle changes to lose weight and stay healthy, such as changing your diet and getting regular exercise. If you have a BMI lower than 18.5 · Your doctor may do other tests to check your risk for health problems. · Talk with your doctor about steps you can take to stay healthy or improve your health. You may need to make lifestyle changes to gain or maintain weight and stay healthy, such as getting more healthy foods in your diet and doing exercises to build muscle. Where can you learn more? Go to http://lily-osiel.info/. Enter S176 in the search box to learn more about \"Body Mass Index: Care Instructions. \" Current as of: October 13, 2016 Content Version: 11.4 © 2837-6213 [employer]. Care instructions adapted under license by [employer] (which disclaims liability or warranty for this information). If you have questions about a medical condition or this instruction, always ask your healthcare professional. [address] any warranty or liability for your use of this information.

## 2018-11-02 NOTE — PROGRESS NOTES
Chief Complaint Patient presents with  Hypertension  
  follow up Arash Mcnair 1. Have you been to the ER, urgent care clinic since your last visit? Hospitalized since your last visit? No 
 
2. Have you seen or consulted any other health care providers outside of the [address] [place] [address] since your last visit? Include any pap smears or colon screening.  No

## 2018-11-23 ENCOUNTER — LAB ONLY (OUTPATIENT)
Dept: FAMILY MEDICINE CLINIC | Age: 48
End: 2018-11-23

## 2018-11-23 ENCOUNTER — HOSPITAL ENCOUNTER (OUTPATIENT)
Dept: LAB | Age: 48
Discharge: HOME | End: 2018-11-23
Payer: COMMERCIAL

## 2018-11-23 DIAGNOSIS — I10 ESSENTIAL HYPERTENSION: ICD-10-CM

## 2018-11-23 DIAGNOSIS — Z01.89 ROUTINE LAB DRAW: Primary | ICD-10-CM

## 2018-11-23 LAB
ANION GAP SERPL CALC-SCNC: 5 MMOL/L (ref 3–18)
BUN SERPL-MCNC: 12 MG/DL (ref 7–18)
BUN/CREAT SERPL: 13 (ref 12–20)
CALCIUM SERPL-MCNC: 9.3 MG/DL (ref 8.5–10.1)
CHLORIDE SERPL-SCNC: 107 MMOL/L (ref 100–108)
CO2 SERPL-SCNC: 30 MMOL/L (ref 21–32)
CREAT SERPL-MCNC: 0.94 MG/DL (ref 0.6–1.3)
GLUCOSE SERPL-MCNC: 98 MG/DL (ref 74–99)
POTASSIUM SERPL-SCNC: 3.9 MMOL/L (ref 3.5–5.5)
SODIUM SERPL-SCNC: 142 MMOL/L (ref 136–145)

## 2018-11-23 PROCEDURE — 80048 BASIC METABOLIC PNL TOTAL CA: CPT

## 2018-11-30 ENCOUNTER — OFFICE VISIT (OUTPATIENT)
Dept: FAMILY MEDICINE CLINIC | Age: 48
End: 2018-11-30

## 2018-11-30 VITALS
SYSTOLIC BLOOD PRESSURE: 179 MMHG | HEIGHT: 65 IN | DIASTOLIC BLOOD PRESSURE: 91 MMHG | OXYGEN SATURATION: 100 % | HEART RATE: 79 BPM | RESPIRATION RATE: 16 BRPM | BODY MASS INDEX: 34.95 KG/M2 | TEMPERATURE: 98.8 F

## 2018-11-30 DIAGNOSIS — I10 ESSENTIAL HYPERTENSION: ICD-10-CM

## 2018-11-30 DIAGNOSIS — E66.9 OBESITY (BMI 30.0-34.9): Primary | ICD-10-CM

## 2018-11-30 NOTE — PROGRESS NOTES
Internal Medicine Follow Up Visit Note    Chief Complaint   Patient presents with    Hypertension       HPI:  James Mcbride is a 57 y.o.  female with history significant for HTN, obesity is here for the above complaint(s). Hypertension: Today BP is uncontrolled. Taking losartan 50 mg and HCTZ 25 mg, last dose this morning. No side effects from medications. Medication good compliance. Increase stress this morning related to getting to appointment on time. Denies headache, lightheadedness, dizziness, vision changes, chest pain, rapid/irregular heart rate, shortness of breath, cough, abdominal pain, leg swelling, leg pain. Working on low salt diet. BP log outside of office with readings averaging 120s-130s/70s-80s mid-day, AM BP before medications 140s/90s. Obesity: Feeling overwhelmed by difficulty with weight loss. Difficulty with low salt diet. Weight not obtained today. Exercise 2x per week, request weight loss med mgmt. Current Outpatient Medications   Medication Sig    cholecalciferol, VITAMIN D3, (VITAMIN D3) 5,000 unit tab tablet Take  by mouth daily.  ferrous sulfate (IRON PO) Take  by mouth.  losartan (COZAAR) 50 mg tablet Take 1 Tab by mouth daily.  hydroCHLOROthiazide (HYDRODIURIL) 25 mg tablet Take 1 Tab by mouth daily for 180 days.  aspirin delayed-release 81 mg tablet Take  by mouth daily.  OTHER Taking for cramps and lighter menses, 1x per month  Indications: Wild Yam 1 tab monthly    levonorgestrel (MIRENA) 20 mcg/24 hr (5 years) IUD 1 Applicator. Starting 7/2015, 5 years     No current facility-administered medications for this visit.       Health Maintenance   Topic Date Due    Influenza Age 5 to Adult  04/16/2019 (Originally 8/1/2018)    PAP AKA CERVICAL CYTOLOGY  01/01/2021    DTaP/Tdap/Td series (3 - Td) 03/05/2028     Immunization History   Administered Date(s) Administered    Tdap 01/01/2002       Allergies and Medications: Reviewed and updated in EMR. Patient Active Problem List   Diagnosis Code    Obesity (BMI 30.0-34. 9) E66.9    Hypertension I10    Anxiety F41.9    Fatigue R53.83    Palpitations R00.2    Screening-pulmonary TB Z11.1       Family History, Social History, Past Medical History, Surgical History: Reviewed and updated in EMR as appropriate. OBJECTIVE:   Visit Vitals  BP (!) 179/91   Pulse 79   Temp 98.8 °F (37.1 °C) (Oral)   Resp 16   Ht 5' 5\" (1.651 m)   LMP 11/07/2018 (Approximate)   SpO2 100%   BMI 34.95 kg/m²        BP Readings from Last 3 Encounters:   11/30/18 (!) 179/91   11/02/18 (!) 168/94   08/02/18 160/90     Wt Readings from Last 3 Encounters:   11/02/18 210 lb (95.3 kg)   08/02/18 206 lb (93.4 kg)   06/18/18 202 lb (91.6 kg)       General: Pleasant adult woman in no acute distress  HEENT: Head is atraumatic normo-cephalic. EXT: 2+ dorsalis pedis pulses bilaterally. No pedal edema bilaterally  Neuro: Alert and oriented x3. Gait wnl. MSE: mood euthymic with underlying irritability, affect congruent and reactive. Nursing Notes Reviewed. LABS/RADIOLOGICAL TESTS:    Lab Results   Component Value Date/Time    Sodium 142 11/23/2018 10:44 AM    Potassium 3.9 11/23/2018 10:44 AM    Chloride 107 11/23/2018 10:44 AM    CO2 30 11/23/2018 10:44 AM    Glucose 98 11/23/2018 10:44 AM    BUN 12 11/23/2018 10:44 AM    Creatinine 0.94 11/23/2018 10:44 AM       All lab results and radiological studies were reviewed and discussed with the patient. ASSESSMENT/PLAN:      ICD-10-CM ICD-9-CM    1. Obesity (BMI 30.0-34. 9) E66.9 278.00 Given info on med mgmt wt loss with [facility]  encouraged increased exercise, low carb diet  Lose it abdiel  Weigh self once a week   2.  Essential hypertension I10 401.9 BP log  Continue current mgmt  Encouraged increased exercise  Low salt diet       Requested Prescriptions      No prescriptions requested or ordered in this encounter     Patient verbalized understanding and agreement with the plan.    Patient was given an after-visit summary. Follow-up Disposition:  Return in about 3 weeks (around 12/21/2018), or if symptoms worsen or fail to improve, for HTN, weight loss. or sooner if worsening symptoms. More than 50% of this 25 min visit was spent counseling the patient face to face about etiology and treatment of health conditions outlined in assessment and plan. Kareem Huynh M.D.   [employer]  [address] [place] [address] [phone]  [phone]

## 2018-11-30 NOTE — PROGRESS NOTES
Chief Complaint   Patient presents with    Hypertension     1. Have you been to the ER, urgent care clinic since your last visit? Hospitalized since your last visit? No    2. Have you seen or consulted any other health care providers outside of the [address] [place] [address] since your last visit? Include any pap smears or colon screening.  No

## 2018-11-30 NOTE — PATIENT INSTRUCTIONS
FOR WEIGHT LOSS:  BE PATIENT WITH YOUR SELF!!!  Do not weigh yourself every day, only once a week  Lose it Kathie for weight loss, for diary of food and for exercise tracking  Try to exercise in the morning, 20 minutes on the treadmill, slowly increase intensity 10 minutes weight lifting/core exercises 3-4 days a week  Sun Mccain NP  Metabolic   [address]  [address] [facility], [address] [place] [address].  Phone: [phone]  Call Tia Ross to ask when next intro class meets to get information and possibly start the program    FOR BLOOD PRESSURE:  Check blood pressure mid-day 2-3  x per week and write down  Make sure you are seated for at least  5-10 minutes, legs uncrossed and back resting  Bring log to clinic next visit  Max salt per day 1.8 grams (1800mg)

## 2018-12-20 ENCOUNTER — OFFICE VISIT (OUTPATIENT)
Dept: FAMILY MEDICINE CLINIC | Age: 48
End: 2018-12-20

## 2018-12-20 VITALS
RESPIRATION RATE: 18 BRPM | HEIGHT: 65 IN | HEART RATE: 95 BPM | SYSTOLIC BLOOD PRESSURE: 166 MMHG | DIASTOLIC BLOOD PRESSURE: 96 MMHG | WEIGHT: 211 LBS | OXYGEN SATURATION: 100 % | BODY MASS INDEX: 35.16 KG/M2 | TEMPERATURE: 98.8 F

## 2018-12-20 DIAGNOSIS — E66.01 SEVERE OBESITY (BMI 35.0-35.9 WITH COMORBIDITY) (HCC): ICD-10-CM

## 2018-12-20 DIAGNOSIS — I10 ESSENTIAL HYPERTENSION: Primary | ICD-10-CM

## 2018-12-20 RX ORDER — LOSARTAN POTASSIUM AND HYDROCHLOROTHIAZIDE 25; 100 MG/1; MG/1
1 TABLET ORAL DAILY
Qty: 30 TAB | Refills: 2 | Status: SHIPPED | OUTPATIENT
Start: 2018-12-20 | End: 2019-01-16 | Stop reason: SDUPTHER

## 2018-12-20 NOTE — PATIENT INSTRUCTIONS
FOR BLOOD PRESSURE:  START TAKING LOSARTAN 100-HCTZ 25 MG COMBO PILL EVERY MORNING  GET BLOOD WORK IN 1-2 WEEKS, SCHEDULE FOR Friday TO MONITOR KIDNEY FUNCTION  IF YOU HAVE LIGHTHEADEDNESS, DIZZINESS, SHORTNESS OF BREATH, INCREASED TIREDNESS WITH THIS MEDICINE CALL CLINIC  Check blood pressure mid-day 2-3  x per week and write down, Make sure you are seated for at least  5-10 minutes, legs uncrossed and back resting  IF BP CONSISTENTLY <100/<60 CALL CLINIC AND GO BACK TO LOSARTAN 50 HCTZ 25 MG  Bring log to clinic next visit IN 6 WEEKS  Max salt per day 1.8 grams (1800mg)    FOR WEIGHT LOSS:  CONSIDER CALLING TO SCHEDULE WITH NUTRITIONIST  BE PATIENT WITH YOUR SELF!!!  Do not weigh yourself every day, only once a week  Lose it Kathie for weight loss, for diary of food and for exercise tracking  Try to exercise in the morning, 20 minutes on the treadmill, slowly increase intensity 10 minutes weight lifting/core exercises 3-4 days a week  Sun Davalos NP  Metabolic   [address]  Phone: [phone]  Call Michele Varela to ask when next intro class meets to get information and possibly start the program

## 2018-12-20 NOTE — PROGRESS NOTES
Internal Medicine Follow Up Visit Note    Chief Complaint   Patient presents with    Hypertension       HPI:  Lisy Kat is a 57 y.o.  female with history significant for uncontrolled HTN, severe obesity is here for the above complaint(s). Last seen 11/30/2018. Hypertension: Today BP is uncontrolled. Taking losartan 50 mg and HCTZ 25 mg, last dose this morning. No side effects from medications. Medication good compliance. Denies headache, lightheadedness, dizziness, vision changes, chest pain, rapid/irregular heart rate, shortness of breath, cough, abdominal pain, leg swelling, leg pain. Working on low salt diet. BP log outside of office with readings averaging 120s-150s/70s-90s mid-day. Exercise since last visit, intermittently, a couple of times a week    Obesity: Still having difficulty with exercise. Current Outpatient Medications   Medication Sig    losartan-hydroCHLOROthiazide (HYZAAR) 100-25 mg per tablet Take 1 Tab by mouth daily for 90 days.  cholecalciferol, VITAMIN D3, (VITAMIN D3) 5,000 unit tab tablet Take  by mouth daily.  ferrous sulfate (IRON PO) Take  by mouth.  aspirin delayed-release 81 mg tablet Take  by mouth daily.  OTHER Taking for cramps and lighter menses, 1x per month  Indications: Wild Yam 1 tab monthly    levonorgestrel (MIRENA) 20 mcg/24 hr (5 years) IUD 1 Applicator. Starting 7/2015, 5 years     No current facility-administered medications for this visit. Health Maintenance   Topic Date Due    Influenza Age 5 to Adult  04/16/2019 (Originally 8/1/2018)    PAP AKA CERVICAL CYTOLOGY  01/01/2021    DTaP/Tdap/Td series (3 - Td) 03/05/2028     Immunization History   Administered Date(s) Administered    Tdap 01/01/2002       Allergies and Medications: Reviewed and updated in EMR.      Patient Active Problem List   Diagnosis Code    Severe obesity (BMI 35.0-35.9 with comorbidity) ([facility] [address] E66.01, Z68.35    Hypertension I10    Anxiety F41.9  Fatigue R53.83    Palpitations R00.2    Screening-pulmonary TB Z11.1       Family History, Social History, Past Medical History, Surgical History: Reviewed and updated in EMR as appropriate. OBJECTIVE:   Visit Vitals  BP (!) 166/96   Pulse 95   Temp 98.8 °F (37.1 °C) (Oral)   Resp 18   Ht 5' 5\" (1.651 m)   Wt 211 lb (95.7 kg)   LMP 12/15/2018   SpO2 100%   BMI 35.11 kg/m²        BP Readings from Last 3 Encounters:   12/20/18 (!) 166/96   11/30/18 (!) 179/91   11/02/18 (!) 168/94     Wt Readings from Last 3 Encounters:   12/20/18 211 lb (95.7 kg)   11/02/18 210 lb (95.3 kg)   08/02/18 206 lb (93.4 kg)       General: Pleasant adult woman in no acute distress  HEENT: Head is atraumatic normo-cephalic. Neuro: Alert and oriented x3. Gait wnl. Nursing Notes Reviewed. LABS/RADIOLOGICAL TESTS:    Lab Results   Component Value Date/Time    Sodium 142 11/23/2018 10:44 AM    Potassium 3.9 11/23/2018 10:44 AM    Chloride 107 11/23/2018 10:44 AM    CO2 30 11/23/2018 10:44 AM    Glucose 98 11/23/2018 10:44 AM    BUN 12 11/23/2018 10:44 AM    Creatinine 0.94 11/23/2018 10:44 AM     All lab results and radiological studies were reviewed and discussed with the patient. ASSESSMENT/PLAN:      ICD-10-CM ICD-9-CM    1. Essential hypertension I10 401.9 losartan-hydroCHLOROthiazide (HYZAAR) 100-25 mg per tablet      METABOLIC PANEL, BASIC  Low salt diet  BP log  RTC in 6 weeks for follow up  Encouraged exercise   2. Severe obesity (BMI 35.0-35.9 with comorbidity) ([facility]) E66.01 278.01 Given information for med mgmt wt loss  Given nutrition referral information  Encouraged exercise    Z68.35 V85.35        Requested Prescriptions     Signed Prescriptions Disp Refills    losartan-hydroCHLOROthiazide (HYZAAR) 100-25 mg per tablet 30 Tab 2     Sig: Take 1 Tab by mouth daily for 90 days. Patient verbalized understanding and agreement with the plan. Patient was given an after-visit summary.     Follow-up Disposition:  Return in about 6 weeks (around 1/31/2019) for HTN. or sooner if worsening symptoms. More than 50% of this 15 min visit was spent counseling the patient face to face about etiology and treatment of health conditions outlined in assessment and plan. Kiera Vasquez M.D.   [address]   [facility] -  [phone]

## 2018-12-20 NOTE — PROGRESS NOTES
Patient is here for 3 week f/u for htn. .1. Have you been to the ER, urgent care clinic since your last visit? Hospitalized since your last visit?no    2. Have you seen or consulted any other health care providers outside of the [address] [place] [address] since your last visit? Include any pap smears or colon screening.  no

## 2018-12-29 DIAGNOSIS — I10 ESSENTIAL HYPERTENSION: ICD-10-CM

## 2019-01-07 NOTE — TELEPHONE ENCOUNTER
Called patient's pharmacy and patient still has one more refill. Called patient to let her know and patient stated she just picked up a refill a week ago. Informed patient it must be [facility]. Patient stated she does not know refill at this time.

## 2019-01-08 RX ORDER — LOSARTAN POTASSIUM AND HYDROCHLOROTHIAZIDE 25; 100 MG/1; MG/1
1 TABLET ORAL DAILY
Qty: 30 TAB | Refills: 2 | Status: CANCELLED | OUTPATIENT
Start: 2019-01-08 | End: 2019-04-08

## 2019-01-08 NOTE — TELEPHONE ENCOUNTER
Per fax from [facility] requesting a 90 day supply Qty 90 tabs  Requested Prescriptions     Pending Prescriptions Disp Refills    losartan (COZAAR) 50 mg tablet [Pharmacy Med Name: LOSARTAN POTASSIUM 50 MG TAB] 30 Tab 1     Sig: TAKE 1 TABLET BY MOUTH EVERY DAY    losartan-hydroCHLOROthiazide (HYZAAR) 100-25 mg per tablet 30 Tab 2     Sig: Take 1 Tab by mouth daily for 90 days.

## 2019-01-15 RX ORDER — LOSARTAN POTASSIUM 50 MG/1
TABLET ORAL
Qty: 30 TAB | Refills: 1 | OUTPATIENT
Start: 2019-01-15

## 2019-01-16 DIAGNOSIS — I10 ESSENTIAL HYPERTENSION: ICD-10-CM

## 2019-01-16 NOTE — TELEPHONE ENCOUNTER
Requested Prescriptions     Pending Prescriptions Disp Refills    losartan-hydroCHLOROthiazide (HYZAAR) 100-25 mg per tablet 30 Tab 2     Sig: Take 1 Tab by mouth daily for 90 days.

## 2019-01-21 NOTE — TELEPHONE ENCOUNTER
Lvm for patient to return call in regards to medication. Advised patient she should be taking Losartan-Hydrochlorothiazide according to last visit. Encourage patient to return call at our office. Rx will be sent to the pharmacy on file. Name and number given.

## 2019-01-22 RX ORDER — LOSARTAN POTASSIUM AND HYDROCHLOROTHIAZIDE 25; 100 MG/1; MG/1
1 TABLET ORAL DAILY
Qty: 30 TAB | Refills: 2 | Status: SHIPPED | OUTPATIENT
Start: 2019-01-22 | End: 2019-07-05 | Stop reason: SDUPTHER

## 2019-02-27 ENCOUNTER — TELEPHONE (OUTPATIENT)
Dept: FAMILY MEDICINE CLINIC | Age: 49
End: 2019-02-27

## 2019-02-27 NOTE — TELEPHONE ENCOUNTER
Informed patient that her Biometric form was received how she does need an appt. Patient verbalized understanding of conversation.

## 2019-02-27 NOTE — TELEPHONE ENCOUNTER
Pt's  called & wanted to know if the form he faxed over will be ready by tomorrow. I told him it was being worked on and another provider needs to sign off on it. He asked if Nurse [place] call him back today please.

## 2019-07-05 DIAGNOSIS — I10 ESSENTIAL HYPERTENSION: ICD-10-CM

## 2019-07-05 RX ORDER — LOSARTAN POTASSIUM AND HYDROCHLOROTHIAZIDE 25; 100 MG/1; MG/1
1 TABLET ORAL DAILY
Qty: 90 TAB | Refills: 0 | Status: SHIPPED | OUTPATIENT
Start: 2019-07-05 | End: 2019-10-07 | Stop reason: SDUPTHER

## 2019-07-05 NOTE — TELEPHONE ENCOUNTER
LPN please call patient and inform blood pressure medication refill sent for 90 days, needs appointment with new provider to establish care and follow up blood pressure prior to additional refills. Emy Victor M.D.   [place] [phone]  [facility] -   [phone]

## 2019-07-05 NOTE — TELEPHONE ENCOUNTER
Contacted patient, two patient identifier confirmed. Informed patient that BP medication was sent to pharmacy for 90 day supply but will need to establish care with new provider for additional refills. Patient was unaware that MD was leaving practice. I advised the patient to call the office if she wanted to establish care with one of the providers here in the office. Patient verbalized understanding and stated she would have to think about it.

## 2019-10-07 ENCOUNTER — OFFICE VISIT (OUTPATIENT)
Dept: FAMILY MEDICINE CLINIC | Age: 49
End: 2019-10-07

## 2019-10-07 VITALS
DIASTOLIC BLOOD PRESSURE: 106 MMHG | BODY MASS INDEX: 34.82 KG/M2 | WEIGHT: 209 LBS | HEIGHT: 65 IN | OXYGEN SATURATION: 99 % | SYSTOLIC BLOOD PRESSURE: 171 MMHG | RESPIRATION RATE: 18 BRPM | HEART RATE: 88 BPM | TEMPERATURE: 98.2 F

## 2019-10-07 DIAGNOSIS — Z13.220 SCREENING, LIPID: ICD-10-CM

## 2019-10-07 DIAGNOSIS — I10 ESSENTIAL HYPERTENSION: ICD-10-CM

## 2019-10-07 DIAGNOSIS — Z13.29 SCREENING FOR THYROID DISORDER: ICD-10-CM

## 2019-10-07 DIAGNOSIS — R73.01 ELEVATED FASTING GLUCOSE: Primary | ICD-10-CM

## 2019-10-07 RX ORDER — LOSARTAN POTASSIUM AND HYDROCHLOROTHIAZIDE 25; 100 MG/1; MG/1
1 TABLET ORAL DAILY
Qty: 90 TAB | Refills: 0 | Status: SHIPPED | OUTPATIENT
Start: 2019-10-07 | End: 2020-01-05

## 2019-10-07 NOTE — PROGRESS NOTES
[facility] VISIT    SUBJECTIVE:     Chief Complaint   Patient presents with    [facility]    Hypertension       HPI: 54 y.o.  female  has a past medical history of Hypertension (2009), Obesity (BMI 30.0-34.9) (2008), and Vitamin D deficiency. is here for the above chief complaint(s). Previous patient of provider that left the practice, Dr. Jose Manuel Schaefer. Hypertension  Patient is currently taking   Key CAD CHF Meds             losartan 50 mg tab 100 mg, hydroCHLOROthiazide 25 mg tab 25 mg (Taking) Take  by mouth daily. aspirin delayed-release 81 mg tablet Take  by mouth daily. . BP today is   BP Readings from Last 1 Encounters:   10/07/19 (!) 171/106     Patient has been taking medications as prescribed. Patient ist checking BP at home. Patient's BP averages 130s/upper 70's. Patient does follow low salt diet. Patient is not currently exercising. Patient denies chest pain, shortness of breath, palpitations, lower extremity edema, dizziness/lightheadedness or syncopal episodes. Patient has been taking losartan 100/25mg for about a year. Patient was taking lisinopril, but had cough with this medication. Patient started at lower dose of losartan and dose was increased after a few visits. Patient has been checking her blood pressure regularly. Patient did eat more salt yesterday than usual.   Repeat 158/92  BP Readings from Last 3 Encounters:   10/07/19 (!) 171/106   12/20/18 (!) 166/96   11/30/18 (!) 179/91         Health Maintenance:    Eye -  no complaints, last eye exam:   Oral Health -  no complaints, last exam:   Hearing -  no complaints. U/A -  no UTI sxs. Cardiac- denies history, denies chest pain. Patient has cardiologist? Recent EKG?   Mammo- declined mammogram this year,    GYN- 2 YEARS AGO, WILL SCHEDULE FOR NEXT YEAR WITH MAMMO  Colonoscopy - no colon cancer in the family, screen at 48       Review of Systems   Constitutional: Negative for chills, fever, malaise/fatigue and weight loss. Eyes: Negative for blurred vision, double vision and pain. Respiratory: Negative for cough, sputum production, shortness of breath and wheezing. Cardiovascular: Negative for chest pain, palpitations, orthopnea, claudication and leg swelling. Gastrointestinal: Negative for abdominal pain, constipation, diarrhea, nausea and vomiting. Genitourinary: Negative for dysuria, frequency and urgency. Neurological: Negative for dizziness, tingling and headaches. [unfilled]  Current Outpatient Medications   Medication Sig    OTHER Black Seed Oil    losartan 50 mg tab 100 mg, hydroCHLOROthiazide 25 mg tab 25 mg Take  by mouth daily.  ferrous sulfate (IRON PO) Take  by mouth.  levonorgestrel (MIRENA) 20 mcg/24 hr (5 years) IUD 1 Applicator. Starting 7/2015, 5 years    cholecalciferol, VITAMIN D3, (VITAMIN D3) 5,000 unit tab tablet Take  by mouth daily.  aspirin delayed-release 81 mg tablet Take  by mouth daily.  OTHER Taking for cramps and lighter menses, 1x per month  Indications: Wild Yam 1 tab monthly     No current facility-administered medications for this visit.       Health Maintenance   Topic Date Due    Influenza Age 5 to Adult  08/01/2019    PAP AKA CERVICAL CYTOLOGY  01/01/2021    DTaP/Tdap/Td series (3 - Td) 03/05/2028    Pneumococcal 0-64 years  Aged Out       Medications and Allergies: Reviewed and confirmed in the chart    Past Medical Hx: Reviewed and confirmed in the chart  Past Medical History:   Diagnosis Date    Hypertension 2009    Obesity (BMI 30.0-34.9) 2008    Vitamin D deficiency        Patient Active Problem List   Diagnosis Code    Severe obesity (BMI 35.0-35.9 with comorbidity) [facility] E66.01, Z68.35    Hypertension I10    Anxiety F41.9    Fatigue R53.83    Palpitations R00.2    Screening-pulmonary TB Z11.1       Family Hx, Surgical Hx, Social Hx: Reviewed and updated in EMR    OBJECTIVE:  Vitals:    10/07/19 1046   BP: (!) 171/106   Pulse: 88 Resp: 18   Temp: 98.2 °F (36.8 °C)   TempSrc: Oral   SpO2: 99%   Weight: 209 lb (94.8 kg)   Height: 5' 5\" (1.651 m)       BP Readings from Last 3 Encounters:   10/07/19 (!) 171/106   12/20/18 (!) 166/96   11/30/18 (!) 179/91     Wt Readings from Last 3 Encounters:   10/07/19 209 lb (94.8 kg)   12/20/18 211 lb (95.7 kg)   11/02/18 210 lb (95.3 kg)       Physical Exam   Constitutional: She is oriented to person, place, and time and well-developed, well-nourished, and in no distress. No distress. Neck: Normal range of motion. Neck supple. No thyromegaly present. Cardiovascular: Normal rate, regular rhythm, normal heart sounds and intact distal pulses. Exam reveals no gallop and no friction rub. No murmur heard. Pulmonary/Chest: Effort normal and breath sounds normal. No respiratory distress. She has no wheezes. She has no rales. She exhibits no tenderness. Lymphadenopathy:     She has no cervical adenopathy. Neurological: She is alert and oriented to person, place, and time. Skin: Skin is warm and dry. She is not diaphoretic. Psychiatric: Mood, memory, affect and judgment normal.   Nursing note and vitals reviewed. Lab Results   Component Value Date/Time    WBC 6.3 02/09/2018 08:45 AM    HGB 12.9 02/09/2018 08:45 AM    HCT 40.2 02/09/2018 08:45 AM    PLATELET 431 22/62/5014 08:45 AM     Lab Results   Component Value Date/Time    Sodium 142 11/23/2018 10:44 AM    Potassium 3.9 11/23/2018 10:44 AM    Chloride 107 11/23/2018 10:44 AM    CO2 30 11/23/2018 10:44 AM    Glucose 98 11/23/2018 10:44 AM    BUN 12 11/23/2018 10:44 AM    Creatinine 0.94 11/23/2018 10:44 AM     Lab Results   Component Value Date/Time    Cholesterol, total 165 02/09/2018 08:45 AM    HDL Cholesterol 52 02/09/2018 08:45 AM    LDL, calculated 91.6 02/09/2018 08:45 AM    Triglyceride 107 02/09/2018 08:45 AM     No results found for: GPT    Nursing Notes Reviewed    ASSESSMENT AND PLAN  Diagnoses and all orders for this visit:    1. Elevated fasting glucose  -     METABOLIC PANEL, COMPREHENSIVE; Future  -     HEMOGLOBIN A1C WITH EAG; Future    2. Essential hypertension  -     CBC WITH AUTOMATED DIFF; Future  -     METABOLIC PANEL, COMPREHENSIVE; Future  -     losartan-hydroCHLOROthiazide (HYZAAR) 100-25 mg per tablet; Take 1 Tab by mouth daily for 90 days. Discussed with patient that she her bp has been very elevated at her last 3 visits. She states that she has been checking her bp and it is always good at home. I have advised her to check her blood pressure for the next 2 weeks at home and bring in her BP cuff to her next visit. Patient verbalized understanding. 1) Goal blood pressure less than equal to 140/90 mmHg, goal BP can vary depending on risk factors as discussed. 2) Lifestyle modifications discussed with patient, low sodium <2 gm, low salt , DASH diet  3) Exercise for at least 30 min 3-5 times a week for goal BMI of less than or equal  To 25.  4) Continue current medications as prescribed. 5) Please begin medication as discussed for better blood pressure control, explained side effects and patient verbalized understanding. 6) Goal LDL<100.  7) Please monitor your blood pressure and keep a log to bring in with you at each visit. 8) Discussed risk factors with patient such as CAD, FAST of stroke symptoms, pt verbalizes understanding. 9) Please avoid smoking , alcohol and any illicit drug use if applicable to you. 10) Discussed lifestyle modifications ,dietary control and BP monitoring at home as patient does not wish to begin an antihypertension agent at present. 3. Screening for thyroid disorder  -     TSH 3RD GENERATION; Future  -     T4, FREE; Future    4. Screening, lipid  -     LIPID PANEL; Future      I have discussed the diagnosis with the patient and the intended plan as seen in the above orders. The patient has received an after-visit summary and questions were answered concerning future plans.   I have discussed medication side effects and warnings with the patient as well. I have reviewed the plan of care with the patient, accepted their input and they are in agreement with the treatment goals. More than 50% of this 30 min visit was spent counseling the patient face to face about etiology and treatment of health conditions outlined in assessment and plan        Alee Francois [place] [phone]  [place] [phone]  [phone]

## 2019-10-07 NOTE — PROGRESS NOTES
Chief Complaint   Patient presents with    Establish Care    Hypertension     1. Have you been to the ER, urgent care clinic since your last visit? Hospitalized since your last visit? No    2. Have you seen or consulted any other health care providers outside of the [address] [place] [address] since your last visit? Include any pap smears or colon screening.  No

## 2019-10-19 LAB
ALBUMIN SERPL-MCNC: 4.3 G/DL (ref 3.5–5.5)
ALBUMIN/GLOB SERPL: 1.6 {RATIO} (ref 1.2–2.2)
ALP SERPL-CCNC: 59 IU/L (ref 39–117)
ALT SERPL-CCNC: 14 IU/L (ref 0–32)
AST SERPL-CCNC: 15 IU/L (ref 0–40)
BASOPHILS # BLD AUTO: 0 X10E3/UL (ref 0–0.2)
BASOPHILS NFR BLD AUTO: 1 %
BILIRUB SERPL-MCNC: 0.3 MG/DL (ref 0–1.2)
BUN SERPL-MCNC: 11 MG/DL (ref 6–24)
BUN/CREAT SERPL: 11 (ref 9–23)
CALCIUM SERPL-MCNC: 9.7 MG/DL (ref 8.7–10.2)
CHLORIDE SERPL-SCNC: 100 MMOL/L (ref 96–106)
CHOLEST SERPL-MCNC: 154 MG/DL (ref 100–199)
CO2 SERPL-SCNC: 23 MMOL/L (ref 20–29)
CREAT SERPL-MCNC: 0.98 MG/DL (ref 0.57–1)
EOSINOPHIL # BLD AUTO: 0.1 X10E3/UL (ref 0–0.4)
EOSINOPHIL NFR BLD AUTO: 1 %
ERYTHROCYTE [DISTWIDTH] IN BLOOD BY AUTOMATED COUNT: 14.3 % (ref 12.3–15.4)
EST. AVERAGE GLUCOSE BLD GHB EST-MCNC: 123 MG/DL
GLOBULIN SER CALC-MCNC: 2.7 G/DL (ref 1.5–4.5)
GLUCOSE SERPL-MCNC: 104 MG/DL (ref 65–99)
HBA1C MFR BLD: 5.9 % (ref 4.8–5.6)
HCT VFR BLD AUTO: 39.3 % (ref 34–46.6)
HDLC SERPL-MCNC: 46 MG/DL
HGB BLD-MCNC: 12.9 G/DL (ref 11.1–15.9)
IMM GRANULOCYTES # BLD AUTO: 0 X10E3/UL (ref 0–0.1)
IMM GRANULOCYTES NFR BLD AUTO: 0 %
LDLC SERPL CALC-MCNC: 90 MG/DL (ref 0–99)
LYMPHOCYTES # BLD AUTO: 2.6 X10E3/UL (ref 0.7–3.1)
LYMPHOCYTES NFR BLD AUTO: 37 %
MCH RBC QN AUTO: 26.8 PG (ref 26.6–33)
MCHC RBC AUTO-ENTMCNC: 32.8 G/DL (ref 31.5–35.7)
MCV RBC AUTO: 82 FL (ref 79–97)
MONOCYTES # BLD AUTO: 0.6 X10E3/UL (ref 0.1–0.9)
MONOCYTES NFR BLD AUTO: 8 %
NEUTROPHILS # BLD AUTO: 3.6 X10E3/UL (ref 1.4–7)
NEUTROPHILS NFR BLD AUTO: 53 %
PLATELET # BLD AUTO: 298 X10E3/UL (ref 150–450)
POTASSIUM SERPL-SCNC: 3.8 MMOL/L (ref 3.5–5.2)
PROT SERPL-MCNC: 7 G/DL (ref 6–8.5)
RBC # BLD AUTO: 4.82 X10E6/UL (ref 3.77–5.28)
SODIUM SERPL-SCNC: 138 MMOL/L (ref 134–144)
T4 FREE SERPL-MCNC: 1.17 NG/DL (ref 0.82–1.77)
TRIGL SERPL-MCNC: 88 MG/DL (ref 0–149)
TSH SERPL DL<=0.005 MIU/L-ACNC: 0.71 UIU/ML (ref 0.45–4.5)
VLDLC SERPL CALC-MCNC: 18 MG/DL (ref 5–40)
WBC # BLD AUTO: 6.9 X10E3/UL (ref 3.4–10.8)

## 2019-10-24 ENCOUNTER — OFFICE VISIT (OUTPATIENT)
Dept: FAMILY MEDICINE CLINIC | Age: 49
End: 2019-10-24

## 2019-10-24 ENCOUNTER — TELEPHONE (OUTPATIENT)
Dept: FAMILY MEDICINE CLINIC | Age: 49
End: 2019-10-24

## 2019-10-24 VITALS
OXYGEN SATURATION: 100 % | HEART RATE: 82 BPM | BODY MASS INDEX: 34.66 KG/M2 | DIASTOLIC BLOOD PRESSURE: 91 MMHG | TEMPERATURE: 98.5 F | SYSTOLIC BLOOD PRESSURE: 167 MMHG | RESPIRATION RATE: 18 BRPM | WEIGHT: 208 LBS | HEIGHT: 65 IN

## 2019-10-24 DIAGNOSIS — I10 ESSENTIAL HYPERTENSION: Primary | ICD-10-CM

## 2019-10-24 RX ORDER — AMLODIPINE BESYLATE 5 MG/1
5 TABLET ORAL DAILY
Qty: 30 TAB | Refills: 0 | Status: SHIPPED | OUTPATIENT
Start: 2019-10-24 | End: 2019-11-14 | Stop reason: SDUPTHER

## 2019-10-24 NOTE — PROGRESS NOTES
Chief Complaint   Patient presents with    Blood Pressure Check    Results     1. Have you been to the ER, urgent care clinic since your last visit? Hospitalized since your last visit? No    2. Have you seen or consulted any other health care providers outside of the [address] [place] [address] since your last visit? Include any pap smears or colon screening.  No

## 2019-10-24 NOTE — TELEPHONE ENCOUNTER
[facility] called and stated pt's combo Rx for Losartan-Hydochlorothiazide is out of stock, not sure if they will be getting it. Please send new Rxs for each individually. She also stated that pt is currently out of this medication so pllease send asap.

## 2019-10-24 NOTE — PROGRESS NOTES
Follow Up Visit Note    Chief Complaint   Patient presents with    Blood Pressure Check    Results       HPI:  Johann Mata is a 54 y.o.  female  has a past medical history of Hypertension (2009), Obesity (BMI 30.0-34.9) (2008), and Vitamin D deficiency. is here for the above complaint(s). Hypertension  Patient is currently taking   Key CAD CHF Meds             losartan-hydroCHLOROthiazide (HYZAAR) 100-25 mg per tablet (Taking) Take 1 Tab by mouth daily for 90 days. aspirin delayed-release 81 mg tablet (Taking) Take  by mouth daily. . BP today is   BP Readings from Last 1 Encounters:   10/24/19 (!) 167/91     Patient has been taking medications as prescribed. Patient did not take her BP medication today. Patient is not currently exercising. Patient denies chest pain, shortness of breath, palpitations, lower extremity edema, dizziness/lightheadedness or syncopal episodes. BP Readings from Last 3 Encounters:   10/24/19 (!) 167/91   10/07/19 (!) 171/106   12/20/18 (!) 166/96       Review of Systems   Constitutional: Negative for chills, fever, malaise/fatigue and weight loss. Eyes: Negative for blurred vision, double vision and pain. Respiratory: Negative for cough, sputum production, shortness of breath and wheezing. Cardiovascular: Negative for chest pain, palpitations, orthopnea, claudication and leg swelling. Gastrointestinal: Negative for abdominal pain, constipation, diarrhea, nausea and vomiting. Genitourinary: Negative for dysuria, frequency and urgency. Neurological: Negative for dizziness, tingling and headaches. Current Outpatient Medications   Medication Sig    OTHER Black Seed Oil    losartan-hydroCHLOROthiazide (HYZAAR) 100-25 mg per tablet Take 1 Tab by mouth daily for 90 days.  cholecalciferol, VITAMIN D3, (VITAMIN D3) 5,000 unit tab tablet Take  by mouth daily.  aspirin delayed-release 81 mg tablet Take  by mouth daily.     OTHER Taking for cramps and lighter menses, 1x per month  Indications: Clearence Santos 1 tab monthly    levonorgestrel (MIRENA) 20 mcg/24 hr (5 years) IUD 1 Applicator. Starting 7/2015, 5 years     No current facility-administered medications for this visit. Health Maintenance   Topic Date Due    Influenza Age 5 to Adult  08/01/2019    PAP AKA CERVICAL CYTOLOGY  01/01/2021    DTaP/Tdap/Td series (3 - Td) 03/05/2028    Pneumococcal 0-64 years  Aged [employer] History   Administered Date(s) Administered    Tdap 01/01/2002       Allergies and Medications: Reviewed and updated in EMR. Past Medical History:   Diagnosis Date    Hypertension 2009    Obesity (BMI 30.0-34.9) 2008    Vitamin D deficiency        Surgical History: Reviewed and updated in EMR as appropriate. Social History: Reviewed and updated in EMR as appropriate. Family History: Reviewed and updated in EMR as appropriate. OBJECTIVE:   Visit Vitals  BP (!) 167/91   Pulse 82   Temp 98.5 °F (36.9 °C) (Oral)   Resp 18   Ht 5' 5\" (1.651 m)   Wt 208 lb (94.3 kg)   LMP 10/03/2019 (Exact Date)   SpO2 100%   BMI 34.61 kg/m²        Physical Exam   Constitutional: She is oriented to person, place, and time and well-developed, well-nourished, and in no distress. No distress. Neck: Normal range of motion. Neck supple. No thyromegaly present. Cardiovascular: Normal rate, regular rhythm, normal heart sounds and intact distal pulses. Exam reveals no gallop and no friction rub. No murmur heard. Pulmonary/Chest: Effort normal and breath sounds normal. No respiratory distress. She has no wheezes. She has no rales. She exhibits no tenderness. Lymphadenopathy:     She has no cervical adenopathy. Neurological: She is alert and oriented to person, place, and time. Skin: Skin is warm and dry. She is not diaphoretic. Psychiatric: Mood, memory, affect and judgment normal.   Nursing note and vitals reviewed.       LABS/RADIOLOGICAL TESTS:  Lab Results Component Value Date/Time    WBC 6.9 10/18/2019 10:10 AM    HGB 12.9 10/18/2019 10:10 AM    HCT 39.3 10/18/2019 10:10 AM    PLATELET 415 85/71/8839 10:10 AM     Lab Results   Component Value Date/Time    Sodium 138 10/18/2019 10:10 AM    Potassium 3.8 10/18/2019 10:10 AM    Chloride 100 10/18/2019 10:10 AM    CO2 23 10/18/2019 10:10 AM    Glucose 104 (H) 10/18/2019 10:10 AM    BUN 11 10/18/2019 10:10 AM    Creatinine 0.98 10/18/2019 10:10 AM     Lab Results   Component Value Date/Time    Cholesterol, total 154 10/18/2019 10:10 AM    HDL Cholesterol 46 10/18/2019 10:10 AM    LDL, calculated 90 10/18/2019 10:10 AM    Triglyceride 88 10/18/2019 10:10 AM     No results found for: GPT  Lab Results   Component Value Date/Time    Hemoglobin A1c 5.9 (H) 10/18/2019 10:10 AM         All lab results and radiological studies were reviewed and discussed with the patient. ASSESSMENT/PLAN:    Diagnoses and all orders for this visit:    1. Essential hypertension  -     amLODIPine (NORVASC) 5 mg tablet; Take 1 Tab by mouth daily. Continue with   Key CAD CHF Meds             losartan-hydroCHLOROthiazide (HYZAAR) 100-25 mg per tablet (Taking) Take 1 Tab by mouth daily for 90 days. aspirin delayed-release 81 mg tablet (Taking) Take  by mouth daily. 1) Goal blood pressure less than equal to 140/90 mmHg, goal BP can vary depending on risk factors as discussed. 2) Lifestyle modifications discussed with patient, low sodium <2 gm, low salt , DASH diet  3) Exercise for at least 30 min 3-5 times a week for goal BMI of less than or equal  To 25.  4) Continue current medications as prescribed. 5) Please begin medication as discussed for better blood pressure control, explained side effects and patient verbalized understanding. 6) Goal LDL<100.  7) Please monitor your blood pressure and keep a log to bring in with you at each visit.   8) Discussed risk factors with patient such as CAD, FAST of stroke symptoms, pt verbalizes understanding. 9) Please avoid smoking , alcohol and any illicit drug use if applicable to you. 10) Discussed lifestyle modifications ,dietary control and BP monitoring at home . Patient verbalized understanding and agreement with the plan. Patient was given an after-visit summary. Follow-up in 2-3 weeks or sooner if worsening symptoms. More than 50% of this 15 min visit was spent counseling the patient face to face about etiology and treatment of health conditions outlined in assessment and plan        Alee Del Cid PA-C  [address], [address] [place] [address] [phone]  [facility] -

## 2019-10-25 NOTE — TELEPHONE ENCOUNTER
Patient informed that provider called pharmacy to clarify losartan/hctz. Patient verbalized understanding.

## 2019-10-25 NOTE — TELEPHONE ENCOUNTER
Pharmacy called and prescription was changed. Please let patient know. Alee Del Cid PA-C  [facility]  [phone]  [address]

## 2019-10-25 NOTE — TELEPHONE ENCOUNTER
Pt called and stated she was very angry that her provider didn't take care of this refill yesterday after her appt. She stated that she told HERNANDEZ Del Cid there is a problem with the Rx for combo medicine Losartan-Hydrochlorthiazide. She doesn't understand why this is not important to us. I apologized and told her the original refill request was sent to Dr Ileana Delaney. I told the pt that I would try to get in touch with her provider today, but let her know she is not in the office on Fridays. The pt then started yelling that \"this med is for her BP. ..is very serious. ..she is out of this med! \" I asked her to please stop yelling and that I was trying to help her. She just continued to yell and talk over me so I hung up the phone.

## 2019-11-14 DIAGNOSIS — I10 ESSENTIAL HYPERTENSION: ICD-10-CM

## 2019-11-14 NOTE — TELEPHONE ENCOUNTER
Requested Prescriptions     Pending Prescriptions Disp Refills    amLODIPine (NORVASC) 5 mg tablet 30 Tab 0     Sig: Take 1 Tab by mouth daily.

## 2019-11-15 RX ORDER — AMLODIPINE BESYLATE 5 MG/1
5 TABLET ORAL DAILY
Qty: 30 TAB | Refills: 0 | Status: SHIPPED | OUTPATIENT
Start: 2019-11-15 | End: 2019-12-30 | Stop reason: SDUPTHER

## 2019-11-15 NOTE — TELEPHONE ENCOUNTER
Patient needs a follow-up visit for a BP recheck. New med was started at last visit 1 month ago. Alee Del Cid PA-C  [facility]  [address]  [address]

## 2019-11-25 ENCOUNTER — OFFICE VISIT (OUTPATIENT)
Dept: FAMILY MEDICINE CLINIC | Age: 49
End: 2019-11-25

## 2019-11-25 VITALS
RESPIRATION RATE: 16 BRPM | SYSTOLIC BLOOD PRESSURE: 146 MMHG | DIASTOLIC BLOOD PRESSURE: 71 MMHG | HEART RATE: 94 BPM | BODY MASS INDEX: 34.99 KG/M2 | HEIGHT: 65 IN | OXYGEN SATURATION: 99 % | WEIGHT: 210 LBS | TEMPERATURE: 97.8 F

## 2019-11-25 DIAGNOSIS — R73.03 PREDIABETES: ICD-10-CM

## 2019-11-25 DIAGNOSIS — I10 ESSENTIAL HYPERTENSION: Primary | ICD-10-CM

## 2019-11-25 NOTE — PROGRESS NOTES
Follow Up Visit Note    Chief Complaint   Patient presents with    Hypertension       HPI:  Brigid Solorio is a 54 y.o.  female  has a past medical history of Hypertension (2009), Obesity (BMI 30.0-34.9) (2008), and Vitamin D deficiency. is here for the above complaint(s). Hypertension  Patient is currently taking   Key CAD CHF Meds             amLODIPine (NORVASC) 5 mg tablet (Taking) Take 1 Tab by mouth daily. losartan-hydroCHLOROthiazide (HYZAAR) 100-25 mg per tablet (Taking) Take 1 Tab by mouth daily for 90 days. aspirin delayed-release 81 mg tablet (Taking) Take  by mouth daily. . BP today is   BP Readings from Last 1 Encounters:   11/25/19 146/71     Patient has been taking medications as prescribed. Patient is checking BP at home. Patient's BP averages 120's/80's. Patient does try follow low salt diet. Patient is not currently exercising. Patient denies chest pain, shortness of breath, palpitations, lower extremity edema, dizziness/lightheadedness or syncopal episodes. Patient has been checking her blood pressure at home and BP has been WNL. She is doing well on amlodipine 5mg daily. Patient started taking amlodipine and HCTZ in the morning and losartan at night. She finds her BP is the best when she does this. Patient denies chest pain, shortness of breath, palpitations, lower extremity edema, dizziness/lightheadedness or syncopal episodes. Prediabetes  Patient has not really been watching the sugar and carbs in her diet. She states that she has also not started an exercise program.   10/21/2019  8:33 AM - Ry, [facility] Lab Results In     Component Value Flag Ref Range Units Status   Hemoglobin A1c 5.9  High   4.8 - 5.6 % Final       Review of Systems   Constitutional: Negative for chills, fever, malaise/fatigue and weight loss. Eyes: Negative for blurred vision, double vision and pain.    Respiratory: Negative for cough, sputum production, shortness of breath and wheezing. Cardiovascular: Negative for chest pain, palpitations, orthopnea, claudication and leg swelling. Gastrointestinal: Negative for abdominal pain, constipation, diarrhea, nausea and vomiting. Genitourinary: Negative for dysuria, frequency and urgency. Neurological: Negative for dizziness, tingling and headaches. Current Outpatient Medications   Medication Sig    amLODIPine (NORVASC) 5 mg tablet Take 1 Tab by mouth daily.  OTHER Black Seed Oil    losartan-hydroCHLOROthiazide (HYZAAR) 100-25 mg per tablet Take 1 Tab by mouth daily for 90 days.  cholecalciferol, VITAMIN D3, (VITAMIN D3) 5,000 unit tab tablet Take  by mouth daily.  aspirin delayed-release 81 mg tablet Take  by mouth daily.  levonorgestrel (MIRENA) 20 mcg/24 hr (5 years) IUD 1 Applicator. Starting 7/2015, 5 years    OTHER Taking for cramps and lighter menses, 1x per month  Indications: Hillery Lank 1 tab monthly     No current facility-administered medications for this visit. Health Maintenance   Topic Date Due    Influenza Age 5 to Adult  08/01/2019    PAP AKA CERVICAL CYTOLOGY  01/01/2021    DTaP/Tdap/Td series (3 - Td) 03/05/2028    Pneumococcal 0-64 years  Aged [employer] History   Administered Date(s) Administered    Tdap 01/01/2002       Allergies and Medications: Reviewed and updated in EMR. Past Medical History:   Diagnosis Date    Hypertension 2009    Obesity (BMI 30.0-34.9) 2008    Vitamin D deficiency        Surgical History: Reviewed and updated in EMR as appropriate. Social History: Reviewed and updated in EMR as appropriate. Family History: Reviewed and updated in EMR as appropriate. OBJECTIVE:   Visit Vitals  /71   Pulse 94   Temp 97.8 °F (36.6 °C) (Oral)   Resp 16   Ht 5' 5\" (1.651 m)   Wt 210 lb (95.3 kg)   SpO2 99%   BMI 34.95 kg/m²        Physical Exam  Vitals signs reviewed. Constitutional:       General: She is not in acute distress.      Appearance: She is not diaphoretic. Neck:      Musculoskeletal: Normal range of motion and neck supple. Thyroid: No thyromegaly. Cardiovascular:      Rate and Rhythm: Normal rate and regular rhythm. Heart sounds: Normal heart sounds. No murmur. No friction rub. No gallop. Pulmonary:      Effort: Pulmonary effort is normal. No respiratory distress. Breath sounds: Normal breath sounds. No wheezing or rales. Chest:      Chest wall: No tenderness. Lymphadenopathy:      Cervical: No cervical adenopathy. Skin:     General: Skin is warm and dry. Neurological:      Mental Status: She is alert and oriented to person, place, and time. Psychiatric:         Mood and Affect: Mood and affect normal.         Cognition and Memory: Memory normal.         Judgment: Judgment normal.         LABS/RADIOLOGICAL TESTS:  Lab Results   Component Value Date/Time    WBC 6.9 10/18/2019 10:10 AM    HGB 12.9 10/18/2019 10:10 AM    HCT 39.3 10/18/2019 10:10 AM    PLATELET 349 96/13/6431 10:10 AM     Lab Results   Component Value Date/Time    Sodium 138 10/18/2019 10:10 AM    Potassium 3.8 10/18/2019 10:10 AM    Chloride 100 10/18/2019 10:10 AM    CO2 23 10/18/2019 10:10 AM    Glucose 104 (H) 10/18/2019 10:10 AM    BUN 11 10/18/2019 10:10 AM    Creatinine 0.98 10/18/2019 10:10 AM     Lab Results   Component Value Date/Time    Cholesterol, total 154 10/18/2019 10:10 AM    HDL Cholesterol 46 10/18/2019 10:10 AM    LDL, calculated 90 10/18/2019 10:10 AM    Triglyceride 88 10/18/2019 10:10 AM     No results found for: GPT  Lab Results   Component Value Date/Time    Hemoglobin A1c 5.9 (H) 10/18/2019 10:10 AM         All lab results and radiological studies were reviewed and discussed with the patient. ASSESSMENT/PLAN:    Diagnoses and all orders for this visit:    1. Essential hypertension  Continue with   Key CAD CHF Meds             amLODIPine (NORVASC) 5 mg tablet (Taking) Take 1 Tab by mouth daily.     losartan-hydroCHLOROthiazide (HYZAAR) 100-25 mg per tablet (Taking) Take 1 Tab by mouth daily for 90 days. aspirin delayed-release 81 mg tablet (Taking) Take  by mouth daily. 1) Goal blood pressure less than equal to 140/90 mmHg, goal BP can vary depending on risk factors as discussed. 2) Lifestyle modifications discussed with patient, low sodium <2 gm, low salt , DASH diet  3) Exercise for at least 30 min 3-5 times a week for goal BMI of less than or equal  To 25.  4) Continue current medications as prescribed. 5) Please begin medication as discussed for better blood pressure control, explained side effects and patient verbalized understanding. 6) Goal LDL<100.  7) Please monitor your blood pressure and keep a log to bring in with you at each visit. 8) Discussed risk factors with patient such as CAD, FAST of stroke symptoms, pt verbalizes understanding. 9) Please avoid smoking , alcohol and any illicit drug use if applicable to you. 10) Discussed lifestyle modifications ,dietary control and BP monitoring at home as patient does not wish to begin an antihypertension agent at present. 2. Prediabetes  -     HEMOGLOBIN A1C WITH EAG; Future  Continue working on diet and exercise to improve a1c. Will recheck in 3 months. ICD-10-CM ICD-9-CM    1. Prediabetes R73.03 790.29 HEMOGLOBIN A1C WITH EAG       Requested Prescriptions      No prescriptions requested or ordered in this encounter     Patient verbalized understanding and agreement with the plan. Patient was given an after-visit summary. Follow-up in 3-4 months or sooner if worsening symptoms. More than 50% of this 25 min visit was spent counseling the patient face to face about etiology and treatment of health conditions outlined in assessment and plan        Alee Del Cid PA-C  [address], [address] [place] [address], [address] [id number] [phone]  BP - [phone]

## 2019-11-25 NOTE — PROGRESS NOTES
Janay Casas presents today for   Chief Complaint   Patient presents with    Hypertension       Is someone accompanying this pt? No    Is the patient using any DME equipment during OV? No    Depression Screening:  3 most recent PHQ Screens 12/20/2018   Little interest or pleasure in doing things Not at all   Feeling down, depressed, irritable, or hopeless Not at all   Total Score PHQ 2 0   Trouble falling or staying asleep, or sleeping too much -   Feeling tired or having little energy -   Poor appetite, weight loss, or overeating -   Feeling bad about yourself - or that you are a failure or have let yourself or your family down -   Trouble concentrating on things such as school, work, reading, or watching TV -   Moving or speaking so slowly that other people could have noticed; or the opposite being so fidgety that others notice -   Thoughts of being better off dead, or hurting yourself in some way -   PHQ 9 Score -       Learning Assessment:  Learning Assessment 1/22/2018   PRIMARY LEARNER Patient   BARRIERS PRIMARY LEARNER NONE   CO-LEARNER CAREGIVER No   PRIMARY LANGUAGE ENGLISH   LEARNER PREFERENCE PRIMARY VIDEOS     DEMONSTRATION     OTHER (COMMENT)   ANSWERED BY patient    RELATIONSHIP SELF       Abuse Screening:  Abuse Screening Questionnaire 1/22/2018   Do you ever feel afraid of your partner? N   Are you in a relationship with someone who physically or mentally threatens you? N   Is it safe for you to go home? Y       Fall Risk  Fall Risk Assessment, last 12 mths 1/22/2018   Able to walk? Yes   Fall in past 12 months? No       Health Maintenance reviewed and discussed and ordered per Provider. Health Maintenance Due   Topic Date Due    Influenza Age 5 to Adult  08/01/2019   . Coordination of Care:  1. Have you been to the ER, urgent care clinic since your last visit? Hospitalized since your last visit? No    2.  Have you seen or consulted any other health care providers outside of the [facility] [address] [employer] since your last visit? Include any pap smears or colon screening.  No      Last  Checked na  Last UDS Checked na  Last Pain contract signed: [email or web address]

## 2019-12-02 ENCOUNTER — TELEPHONE (OUTPATIENT)
Dept: FAMILY MEDICINE CLINIC | Age: 49
End: 2019-12-02

## 2019-12-02 DIAGNOSIS — Z12.31 SCREENING MAMMOGRAM, ENCOUNTER FOR: Primary | ICD-10-CM

## 2019-12-18 ENCOUNTER — TELEPHONE (OUTPATIENT)
Dept: FAMILY MEDICINE CLINIC | Age: 49
End: 2019-12-18

## 2019-12-18 DIAGNOSIS — N64.4 BREAST PAIN: Primary | ICD-10-CM

## 2019-12-18 NOTE — TELEPHONE ENCOUNTER
[facility] because [facility] has [facility] pain, a DIAGNOSTIC mammo is needed. It may also be a good idea to order an US \"if needed\" as well. They also faxed a request for a diagnostic mammo w/ no response.  Please send ASAP.   ---------------------------------------------------------    Windy Lopez at 12/02/19 0941        Pt requesting order for mammo be sent [facility]

## 2019-12-19 NOTE — TELEPHONE ENCOUNTER
Order placed and given to the front to fax. Alee Del Cid PA-C  [facility]  [address]  [address]

## 2019-12-30 DIAGNOSIS — I10 ESSENTIAL HYPERTENSION: ICD-10-CM

## 2019-12-30 RX ORDER — AMLODIPINE BESYLATE 5 MG/1
5 TABLET ORAL DAILY
Qty: 90 TAB | Refills: 0 | Status: SHIPPED | OUTPATIENT
Start: 2019-12-30

## 2022-03-18 PROBLEM — F41.9 ANXIETY: Status: ACTIVE | Noted: 2018-01-22

## 2022-03-18 PROBLEM — Z11.1 SCREENING-PULMONARY TB: Status: ACTIVE | Noted: 2018-07-25

## 2022-03-18 PROBLEM — R53.83 FATIGUE: Status: ACTIVE | Noted: 2018-01-22

## 2022-03-20 PROBLEM — R00.2 PALPITATIONS: Status: ACTIVE | Noted: 2018-01-22

## 2022-10-06 NOTE — PROGRESS NOTES
Internal Medicine Follow Up Visit Note    Chief Complaint   Patient presents with    Hypertension    Anxiety       HPI:  Charmaine Chapman is a 41 y.o.  female with history significant for HTN and obesity is here for the above complaint(s). Last seen 3/5/2018. Hypertension: Today BP is uncontrolled. Taking HCTZ 25 mg daily. No side effects from medications. Medication fair, missing rarely, fair compliance. Denies headache, lightheadedness, dizziness, vision changes, chest pain, rapid/irregular heart rate, shortness of breath, cough, abdominal pain, leg swelling, leg pain. Diet and exercises working on low salt diet. Does not check BP outside of office with, buffalo wild wings for dinner last night. Anxiety: Since last visit anxiety is \"better\". Has been walking more and working on meditation. Working less which is helpful for mood. For past 2 weeks reports more than half days of eating less, and several days of trouble concentrating, feeling nervous on edge, worrying too much and irritable mood. Cup of coffee and hand full of walnuts or almonds for breakfast. Water intake \"a lot\" per day. Used to enjoy reading, going to [employer], shopping, going to [employer] more, riding bike. Obesity: Up 4 lb since last visit. Eating 2 meals per day, eating out, picking up stuff on the go. ROS: as per HPI    Current Outpatient Prescriptions   Medication Sig    hydroCHLOROthiazide (HYDRODIURIL) 25 mg tablet Take 1 Tab by mouth daily for 180 days.  OTHER Taking for cramps and lighter menses, 1x per month  Indications: [place] 1 tab monthly    levonorgestrel (MIRENA) 20 mcg/24 hr (5 years) IUD 1 Applicator. Starting 7/2015, 5 years    cholecalciferol, VITAMIN D3, (VITAMIN D3) 5,000 unit tab tablet Take 30,000 Units by mouth every seven (7) days. No current facility-administered medications for this visit.       Health Maintenance   Topic Date Due    PAP AKA CERVICAL CYTOLOGY  01/01/2021    Onset:  10/2/22   description:  34 yo     Full Name of Provider seen for current symptoms: Danielle Diana NP     Symptoms: hand laceration, pt stitches are coming apart,  concerns    [facility] seen [facility] [place] 10/2/22 for a small  laceration to  left hand/ workman's comp.     Per pt, laceration and stitches to Left hand below  Pt's thumb.  It  looks like 2 of them are pulling though/ out, not bleeding , not gaping  Open wound.      Pt Can see that one is almost through the  1st part of her  skin,  The other  one is starting to pull though the skin.  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     No fever, incision  does not look infected, it is a clean cut.   2 cm in length per pt, 6 stitches,  The first 2 stitches that are closer to her wrist  are  falling out, they are  right next each other.   If  Pt stretches her  hand too far it hurts a little, there is puling on the incision.  Pain 1-2/10, uncomf/ burning sensation if stretches hand too much.      Incision is  still closed.  Pt Was told to F/u 10-14 days to remove sutures.     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  Are you pregnant or breast feeding:  Na   Recent visits (last 3-4 weeks) for same reason or recent surgery:  10/2/22     PLAN:  See Provider within next few days or can go to      Patient/Caller agrees to follow recommendations.    Reason for Disposition  • [1] Suture (or staple) came out early AND [2] > 48 hours since sutures placed AND [3] caller wants wound checked    Protocols used: SUTURE OR STAPLE [id number]       DTaP/Tdap/Td series (2 - Td) 03/05/2028    Influenza Age 5 to Adult  Addressed       There is no immunization history on file for this patient. Allergies and Medications: Reviewed and updated in EMR. Past Medical History:   Diagnosis Date    Hypertension 2009    Obesity (BMI 30.0-34.9) 2008    Vitamin D deficiency        Family History, Social History, Past Medical History, Surgical History: Reviewed and updated in EMR as appropriate. OBJECTIVE:   Visit Vitals    BP (!) 158/92    Pulse 83    Temp 98.7 °F (37.1 °C) (Oral)    Resp 16    Ht 5' 5\" (1.651 m)    Wt 206 lb (93.4 kg)    SpO2 100%  Comment: ra    BMI 34.28 kg/m2        BP Readings from Last 3 Encounters:   04/16/18 (!) 158/92   03/05/18 150/79   01/22/18 152/84     Wt Readings from Last 3 Encounters:   04/16/18 206 lb (93.4 kg)   03/05/18 202 lb (91.6 kg)   01/22/18 197 lb 12.8 oz (89.7 kg)       General: Pleasant adult woman sitting comfortably in no acute distress  HEENT: Head is atraumatic normo-cephalic. MSE: Dressed casually, good GH, good EC. Speech normal, spontaneous. Mood euthymic, affect congruent and reactive. No PMA/R. TP linear goal directed, content as per HPI. Cognition grossly intact, not formally assessed. Fair insight and judgment. Fair impulse control. PHQ-9: 3, Somewhat difficult  GREG-7: 3, somewhat difficult    3/5/2018 PHQ-9: 7, somewhat difficult  3/5/2018 GREG-7: 6, somewhat difficult     Nursing Notes Reviewed.     LABS/RADIOLOGICAL TESTS:      Lab Results   Component Value Date/Time    WBC 6.3 02/09/2018 08:45 AM    HGB 12.9 02/09/2018 08:45 AM    HCT 40.2 02/09/2018 08:45 AM    PLATELET 978 30/09/1204 08:45 AM     Lab Results   Component Value Date/Time    Sodium 137 02/09/2018 08:45 AM    Potassium 3.5 02/09/2018 08:45 AM    Chloride 100 02/09/2018 08:45 AM    CO2 28 02/09/2018 08:45 AM    Glucose 93 02/09/2018 08:45 AM    BUN 9 02/09/2018 08:45 AM    Creatinine 0.88 02/09/2018 08:45 AM     Lab Results Component Value Date/Time    Cholesterol, total 165 02/09/2018 08:45 AM    HDL Cholesterol 52 02/09/2018 08:45 AM    LDL, calculated 91.6 02/09/2018 08:45 AM    Triglyceride 107 02/09/2018 08:45 AM       All lab results and radiological studies were reviewed and discussed with the patient. ASSESSMENT/PLAN:      ICD-10-CM ICD-9-CM    1. Essential hypertension I10 401.9 lisinopril (PRINIVIL, ZESTRIL) 10 mg tablet q AM  conitnue HCTZ 25 mg daily  BP log  DASH diet  Exercise      METABOLIC PANEL, BASIC CL4-3 weeks  Discussed r/b/se of med proposed   2. Anxiety:Improved F41.9 300.00 Continue bhvr plan, exercise-meditation  Encouraged to increase activities enjoyed as discussed in clinic   3. Obesity (BMI 30.0-34. 9) E66.9 278.00 REFERRAL TO NUTRITION  Food log for 1 week  Continue exercise       Requested Prescriptions     Signed Prescriptions Disp Refills    lisinopril (PRINIVIL, ZESTRIL) 10 mg tablet 30 Tab 2     Sig: Take 1 Tab by mouth daily for 90 days. Patient verbalized understanding and agreement with the plan. Patient was given an after-visit summary. Follow-up Disposition:  Return in about 4 weeks (around 5/14/2018), or if symptoms worsen or fail to improve, for BP, obesity. or sooner if worsening symptoms. More than 50% of this 25 min visit was spent counseling the patient face to face about etiology and treatment of health conditions outlined in assessment and plan. Vinh Charles M.D.   [address], [address] [place] [address] [phone]  [place] [phone]

## 2023-01-31 RX ORDER — AMLODIPINE BESYLATE 5 MG/1
5 TABLET ORAL DAILY
COMMUNITY
Start: 2019-12-30

## 2023-01-31 RX ORDER — ASPIRIN 81 MG/1
TABLET ORAL DAILY
COMMUNITY

## 2025-02-17 NOTE — TELEPHONE ENCOUNTER
Pt's  called to check on status of insurance paperwork that was faxed to our office last week to be completed.  He can be contacted at [phone] Jose Carrero MD